# Patient Record
Sex: MALE | Race: WHITE
[De-identification: names, ages, dates, MRNs, and addresses within clinical notes are randomized per-mention and may not be internally consistent; named-entity substitution may affect disease eponyms.]

---

## 2019-12-26 ENCOUNTER — HOSPITAL ENCOUNTER (EMERGENCY)
Dept: HOSPITAL 97 - ER | Age: 64
Discharge: HOME | End: 2019-12-26
Payer: COMMERCIAL

## 2019-12-26 VITALS — OXYGEN SATURATION: 100 % | TEMPERATURE: 98.2 F

## 2019-12-26 DIAGNOSIS — E03.9: ICD-10-CM

## 2019-12-26 DIAGNOSIS — S91.331A: Primary | ICD-10-CM
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
## 2019-12-26 DIAGNOSIS — Z88.5: ICD-10-CM
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
## 2019-12-26 DIAGNOSIS — E11.9: ICD-10-CM

## 2019-12-26 DIAGNOSIS — Z23: ICD-10-CM

## 2019-12-26 PROCEDURE — 90471 IMMUNIZATION ADMIN: CPT

## 2019-12-26 PROCEDURE — 90714 TD VACC NO PRESV 7 YRS+ IM: CPT

## 2019-12-26 PROCEDURE — 99283 EMERGENCY DEPT VISIT LOW MDM: CPT

## 2019-12-26 NOTE — EDPHYS
Physician Documentation                                                                           

 [facility]                                                                 

Name: Yariel Saini                                                                                  

Age: 64 yrs                                                                                       

Sex: Male                                                                                         

: 1955                                                                                   

MRN: [id number]                                                                                   

Arrival Date: 2019                                                                          

Time: 13:45                                                                                       

Account#: [id number]                                                                            

Bed 11                                                                                            

Private MD:                                                                                       

ED Physician Donato Morillo                                                                         

HPI:                                                                                              

                                                                                             

14:45 This 64 yrs old  Male presents to ER via Wheelchair with complaints of         kb  

      Puncture Wound To Foot.                                                                     

14:42 The patient presents with a puncture wound, from a nail. The complaints affect the      kb  

      right foot. Context: The problem was sustained outdoors, resulted from the patient          

      stepping on a nail, the patient can fully bear weight, the patient is able to ambulate.     

      Onset: The symptoms/episode began/occurred yesterday. Modifying factors: The symptoms       

      are alleviated by nothing, the symptoms are aggravated by nothing. Associated signs and     

      symptoms: The patient has no apparent associated signs or symptoms. Severity of             

      symptoms: At their worst the symptoms were mild, in the emergency department the            

      symptoms are unchanged. The patient has not experienced similar symptoms in the past.       

      The patient has not recently seen a physician. Pt stepped on nail yesterday. Pt is          

      diabetic and also needs tetanus shot.                                                       

                                                                                                  

Historical:                                                                                       

- Allergies:                                                                                      

14:16 Codeine;                                                                                iw  

- Home Meds:                                                                                      

14:16 Metformin Oral [Active]; Lactulose Oral [Active]; rifaximin oral oral [Active]; [place]iv[place]  iw  

      Thyroid 60 mg Oral tab 60 mg after meals and before bedtime for Hypothyroidism [Active];    

- PMHx:                                                                                           

14:16 Hypothyroidism; Diabetes - NIDDM; liver issues;                                         iw  

- PSHx:                                                                                           

14:16 left shoulder;                                                                          iw  

                                                                                                  

- Immunization history:: Adult Immunizations unknown.                                             

- Social history:: Smoking status: Patient/guardian denies using tobacco.                         

- Ebola Screening: : Patient negative for fever greater than or equal to 101.5 degrees            

  Fahrenheit, and additional compatible Ebola Virus Disease symptoms Patient denies               

  exposure to infectious person Patient denies travel to an Ebola-affected area in the            

  21 days before illness onset No symptoms or risks identified at this time.                      

                                                                                                  

                                                                                                  

ROS:                                                                                              

14:42 MS/extremity: Positive for                                                              kb  

14:42 Constitutional: Negative for fever, chills, and weight loss, Neck: Negative for injury,     

      pain, and swelling, Cardiovascular: Negative for chest pain, palpitations, and edema,       

      Respiratory: Negative for shortness of breath, cough, wheezing, and pleuritic chest         

      pain, Abdomen/GI: Negative for abdominal pain, nausea, vomiting, diarrhea, and              

      constipation, Back: Negative for injury and pain, MS/Extremity: Negative for injury and     

      deformity, Neuro: Negative for headache, weakness, numbness, tingling, and seizure.         

14:42 Skin: Positive for puncture, of the heel of right foot.                                     

                                                                                                  

Exam:                                                                                             

14:41 Constitutional:  This is a well developed, well nourished patient who is awake, alert,  kb  

      and in no acute distress. Head/Face:  Normocephalic, atraumatic. Chest/axilla:  Normal      

      chest wall appearance and motion.  Nontender with no deformity.  No lesions are             

      appreciated. Cardiovascular:  Regular rate and rhythm with a normal S1 and S2.  No          

      gallops, murmurs, or rubs.  Normal PMI, no JVD.  No pulse deficits. Respiratory:  Lungs     

      have equal breath sounds bilaterally, clear to auscultation and percussion.  No rales,      

      rhonchi or wheezes noted.  No increased work of breathing, no retractions or nasal          

      flaring. Abdomen/GI:  Soft, non-tender, with normal bowel sounds.  No distension or         

      tympany.  No guarding or rebound.  No evidence of tenderness throughout. MS/ Extremity:     

       Pulses equal, no cyanosis.  Neurovascular intact.  Full, normal range of motion.           

      Neuro:  Awake and alert, GCS 15, oriented to person, place, time, and situation.            

      Cranial nerves II-XII grossly intact.  Motor strength 5/5 in all extremities.  Sensory      

      grossly intact.  Cerebellar exam normal.  Normal gait.                                      

14:41 Skin: injury, puncture(s), that are superficial, of the heel of right foot.                 

                                                                                                  

Vital Signs:                                                                                      

14:16 Pulse 84; Resp 16; Temp 98.2; Pulse Ox 100% ; Weight 78.93 kg; Height 5 ft. 10 in.      iw  

      (177.80 cm); Pain 8/10;                                                                     

14:16 Body Mass Index 24.97 (78.93 kg, 177.80 cm)                                             iw  

                                                                                                  

MDM:                                                                                              

14:08 Patient medically screened.                                                             kb  

14:41 Data reviewed: vital signs, nurses notes. Data interpreted: Pulse oximetry: on room air kb  

      is 100 %. Interpretation: normal.                                                           

14:43 Counseling: I had a detailed discussion with the patient and/or guardian regarding: the kb  

      historical points, exam findings, and any diagnostic results supporting the                 

      discharge/admit diagnosis, radiology results, the need for outpatient follow up, a          

      family practitioner, to return to the emergency department if symptoms worsen or            

      persist or if there are any questions or concerns that arise at home.                       

                                                                                                  

                                                                                             

14:10 Order name: Foot Right 3 View XRAY; Complete Time: 14:50                                kb  

                                                                                                  

Administered Medications:                                                                         

15:05 Drug: Tetanus-Diphtheria Toxoid Adult 0.5 ml {: [employer]. Exp:         iw  

      2020. Lot #: [id number]. } Route: IM; Site: right deltoid;                                

15:10 Drug: KeFLEX 500 mg Route: PO;                                                          iw  

                                                                                                  

                                                                                                  

Disposition:                                                                                      

19:05 Co-signature as Attending Physician, Donato Morillo MD.                                    rn  

                                                                                                  

Disposition:                                                                                      

19 14:45 Discharged to Home. Impression: Puncture wound without foreign body of foot.       

- Condition is Stable.                                                                            

- Discharge Instructions: Puncture Wound, Easy-to-Read.                                           

- Prescriptions for Keflex 500 mg Oral Capsule - take 1 capsule by ORAL route every 8             

  hours for 10 days; 30 capsule.                                                                  

- Medication Reconciliation Form, Thank You Letter, Antibiotic Education, Prescription            

  Opioid Use form.                                                                                

- Follow up: Emergency Department; When: As needed; Reason: Worsening of condition.               

  Follow up: Private Physician; When: 2 - 3 days; Reason: Recheck today's complaints,             

  Continuance of care, Re-evaluation by your physician.                                           

                                                                                                  

                                                                                                  

                                                                                                  

Signatures:                                                                                       

Dispatcher [employer]                           [facility]                                                 

Neena Scruggs, FNP-C                 FNP-Kayley Zimmer RN RN iw Nieto, Roman, MD MD   rn                                                   

                                                                                                  

Corrections: (The following items were deleted from the chart)                                    

15:20 14:45 2019 14:45 Discharged to Home. Impression: Puncture wound without foreign   iw  

      body of foot. Condition is Stable. Discharge Instructions: Puncture Wound,                  

      Easy-to-Read. Prescriptions for Keflex 500 mg Oral Capsule - take 1 capsule by ORAL         

      route every 8 hours for 10 days; 30 capsule. and Forms are Medication Reconciliation        

      Form, Thank You Letter, Antibiotic Education, Prescription Opioid Use. Follow up:           

      Emergency Department; When: As needed; Reason: Worsening of condition. Follow up:           

      Private Physician; When: 2 - 3 days; Reason: Recheck today's complaints, Continuance of     

      care, Re-evaluation by your physician. kb                                                   

                                                                                                  

**************************************************************************************************

## 2019-12-26 NOTE — ER
Nurse's Notes                                                                                     

 [facility]                                                                 

Name: Yariel Saini                                                                                  

Age: 64 yrs                                                                                       

Sex: Male                                                                                         

: 1955                                                                                   

MRN: [id number]                                                                                   

Arrival Date: 2019                                                                          

Time: 13:45                                                                                       

Account#: [id number]                                                                            

Bed 11                                                                                            

Private MD:                                                                                       

Diagnosis: Puncture wound without foreign body of foot                                            

                                                                                                  

Presentation:                                                                                     

                                                                                             

14:12 Presenting complaint: Patient states: steppe on a nail yesterday evening, right foot.   iw  

      Transition of care: patient was not received from another setting of care. Onset of         

      symptoms was 2019. Risk Assessment: Do you want to hurt yourself or            

      someone else? Patient reports no desire to harm self or others. Initial Sepsis Screen:      

      Does the patient meet any 2 criteria? No. Patient's initial sepsis screen is negative.      

      Does the patient have a suspected source of infection? No. Patient's initial sepsis         

      screen is negative. Care prior to arrival: None.                                            

14:12 Method Of Arrival: Wheelchair                                                           iw  

14:12 Acuity: ALESSANDRA 4                                                                           iw  

                                                                                                  

Triage Assessment:                                                                                

15:00 General: Appears Behavior is calm.                                                      iw  

                                                                                                  

Historical:                                                                                       

- Allergies:                                                                                      

14:16 Codeine;                                                                                iw  

- Home Meds:                                                                                      

14:16 Metformin Oral [Active]; Lactulose Oral [Active]; rifaximin oral oral [Active]; [place]  iw  

      Thyroid 60 mg Oral tab 60 mg after meals and before bedtime for Hypothyroidism [Active];    

- PMHx:                                                                                           

14:16 Hypothyroidism; Diabetes - NIDDM; liver issues;                                         iw  

- PSHx:                                                                                           

14:16 left shoulder;                                                                          iw  

                                                                                                  

- Immunization history:: Adult Immunizations unknown.                                             

- Social history:: Smoking status: Patient/guardian denies using tobacco.                         

- Ebola Screening: : Patient negative for fever greater than or equal to 101.5 degrees            

  Fahrenheit, and additional compatible Ebola Virus Disease symptoms Patient denies               

  exposure to infectious person Patient denies travel to an Ebola-affected area in the            

  21 days before illness onset No symptoms or risks identified at this time.                      

                                                                                                  

                                                                                                  

Screening:                                                                                        

15:15 Abuse screen: Denies threats or abuse. Denies injuries from another. Nutritional        iw  

      screening: No deficits noted. Tuberculosis screening: No symptoms or risk factors           

      identified. Fall Risk None identified.                                                      

                                                                                                  

Assessment:                                                                                       

14:50 General: Appears in no apparent distress. Behavior is calm, cooperative. Pain:          iw  

      Complains of pain in right foot and heel of right foot. Neuro: Level of Consciousness       

      is awake, alert, obeys commands. Cardiovascular: Patient's skin is warm and dry.            

      Respiratory: Airway Respiratory effort is even, unlabored. Derm: Skin is intact, is         

      healthy with good turgor. Musculoskeletal: Range of motion: intact in all extremities.      

                                                                                                  

Vital Signs:                                                                                      

14:16 Pulse 84; Resp 16; Temp 98.2; Pulse Ox 100% ; Weight 78.93 kg; Height 5 ft. 10 in.      iw  

      (177.80 cm); Pain 8/10;                                                                     

14:16 Body Mass Index 24.97 (78.93 kg, 177.80 cm)                                             iw  

                                                                                                  

ED Course:                                                                                        

13:45 Patient arrived in ED.                                                                  rg4 

14:08 [facility]                                                        kb  

14:08 Donato Morillo MD is Attending Physician.                                                kb  

14:11 Margret Marshall, RN is Primary Nurse.                                                  dm5 

14:14 Triage completed.                                                                       iw  

14:17 Primary Nurse role handed off by Margret Marshall, DINORAH                                   iw  

14:17 Kayley Colindres RN is Primary Nurse.                                                   iw  

14:17 Arm band placed on.                                                                     iw  

14:36 Foot Right 3 View XRAY In Process Unspecified.                                          EDMS

14:50 Patient has correct armband on for positive identification.                             iw  

15:19 No provider procedures requiring assistance completed. Patient did not have IV access   iw  

      during this emergency room visit.                                                           

                                                                                                  

Administered Medications:                                                                         

15:05 Drug: Tetanus-Diphtheria Toxoid Adult 0.5 ml {: [employer]. Exp:         iw  

      2020. Lot #: [id number]. } Route: IM; Site: right deltoid;                                

15:10 Drug: KeFLEX 500 mg Route: PO;                                                          iw  

                                                                                                  

                                                                                                  

Outcome:                                                                                          

14:45 Discharge ordered by MD.                                                                kb  

15:19 Discharged to home ambulatory.                                                          iw  

15:19 Condition: good                                                                             

15:19 Discharge instructions given to patient, Instructed on discharge instructions, follow       

      up and referral plans. Demonstrated understanding of instructions, follow-up care,          

      medications, Prescriptions given X 1.                                                       

15:20 Patient left the ED.                                                                    iw  

                                                                                                  

Signatures:                                                                                       

Dispatcher [employer]                           EDMS                                                 

Neena Scruggs FNP-C FNP-Margret Terry RN RN   dm5                                                  

Kayley Colindres RN                     RN                                                      

Pricilla Martinez                                 rg4                                                  

                                                                                                  

**************************************************************************************************

## 2019-12-26 NOTE — RAD REPORT
EXAM DESCRIPTION:  RAD - Foot Right 3 View - 12/26/2019 2:36 pm

 

CLINICAL HISTORY:  r/o fb

Pain and swelling

 

COMPARISON:  No comparisons

 

FINDINGS:  Soft tissue swelling is seen along the medial aspect of the foot. No fracture or radiopaqu
e foreign body seen.

## 2022-03-27 ENCOUNTER — HOSPITAL ENCOUNTER (OUTPATIENT)
Dept: HOSPITAL 97 - ER | Age: 67
Setting detail: OBSERVATION
LOS: 2 days | Discharge: HOME | End: 2022-03-29
Attending: HOSPITALIST | Admitting: HOSPITALIST
Payer: COMMERCIAL

## 2022-03-27 VITALS — BODY MASS INDEX: 22.7 KG/M2

## 2022-03-27 DIAGNOSIS — J11.1: Primary | ICD-10-CM

## 2022-03-27 DIAGNOSIS — E03.9: ICD-10-CM

## 2022-03-27 DIAGNOSIS — B18.2: ICD-10-CM

## 2022-03-27 DIAGNOSIS — Z20.822: ICD-10-CM

## 2022-03-27 DIAGNOSIS — D70.9: ICD-10-CM

## 2022-03-27 DIAGNOSIS — D69.6: ICD-10-CM

## 2022-03-27 DIAGNOSIS — E11.9: ICD-10-CM

## 2022-03-27 DIAGNOSIS — K74.60: ICD-10-CM

## 2022-03-27 LAB
ALBUMIN SERPL BCP-MCNC: 3.3 G/DL (ref 3.4–5)
ALP SERPL-CCNC: 100 U/L (ref 45–117)
ALT SERPL W P-5'-P-CCNC: 69 U/L (ref 12–78)
AST SERPL W P-5'-P-CCNC: 72 U/L (ref 15–37)
BUN BLD-MCNC: 10 MG/DL (ref 7–18)
GLUCOSE SERPLBLD-MCNC: 231 MG/DL (ref 74–106)
HCT VFR BLD CALC: 40.4 % (ref 39.6–49)
INR BLD: 1.27
LYMPHOCYTES # SPEC AUTO: 0.3 K/UL (ref 0.7–4.9)
MAGNESIUM SERPL-MCNC: 1.8 MG/DL (ref 1.8–2.4)
NT-PROBNP SERPL-MCNC: 45 PG/ML (ref ?–125)
PMV BLD: 9.4 FL (ref 7.6–11.3)
POTASSIUM SERPL-SCNC: 4 MMOL/L (ref 3.5–5.1)
RBC # BLD: 3.96 M/UL (ref 4.33–5.43)
SARS-COV-2 RNA RESP QL NAA+PROBE: NEGATIVE
TROPONIN I SERPL HS-MCNC: 15.7 PG/ML (ref ?–58.9)
URINE UROTHELIAL CELLS: <5 /HPF

## 2022-03-27 PROCEDURE — 83880 ASSAY OF NATRIURETIC PEPTIDE: CPT

## 2022-03-27 PROCEDURE — 93005 ELECTROCARDIOGRAM TRACING: CPT

## 2022-03-27 PROCEDURE — 85025 COMPLETE CBC W/AUTO DIFF WBC: CPT

## 2022-03-27 PROCEDURE — 81015 MICROSCOPIC EXAM OF URINE: CPT

## 2022-03-27 PROCEDURE — 84100 ASSAY OF PHOSPHORUS: CPT

## 2022-03-27 PROCEDURE — 99285 EMERGENCY DEPT VISIT HI MDM: CPT

## 2022-03-27 PROCEDURE — 83735 ASSAY OF MAGNESIUM: CPT

## 2022-03-27 PROCEDURE — 85610 PROTHROMBIN TIME: CPT

## 2022-03-27 PROCEDURE — 80053 COMPREHEN METABOLIC PANEL: CPT

## 2022-03-27 PROCEDURE — 80048 BASIC METABOLIC PNL TOTAL CA: CPT

## 2022-03-27 PROCEDURE — 80076 HEPATIC FUNCTION PANEL: CPT

## 2022-03-27 PROCEDURE — 96374 THER/PROPH/DIAG INJ IV PUSH: CPT

## 2022-03-27 PROCEDURE — 71045 X-RAY EXAM CHEST 1 VIEW: CPT

## 2022-03-27 PROCEDURE — 87040 BLOOD CULTURE FOR BACTERIA: CPT

## 2022-03-27 PROCEDURE — 82947 ASSAY GLUCOSE BLOOD QUANT: CPT

## 2022-03-27 PROCEDURE — 84145 PROCALCITONIN (PCT): CPT

## 2022-03-27 PROCEDURE — 0240U: CPT

## 2022-03-27 PROCEDURE — 71260 CT THORAX DX C+: CPT

## 2022-03-27 PROCEDURE — 84443 ASSAY THYROID STIM HORMONE: CPT

## 2022-03-27 PROCEDURE — 83605 ASSAY OF LACTIC ACID: CPT

## 2022-03-27 PROCEDURE — 81003 URINALYSIS AUTO W/O SCOPE: CPT

## 2022-03-27 PROCEDURE — 84439 ASSAY OF FREE THYROXINE: CPT

## 2022-03-27 PROCEDURE — 96375 TX/PRO/DX INJ NEW DRUG ADDON: CPT

## 2022-03-27 PROCEDURE — 36415 COLL VENOUS BLD VENIPUNCTURE: CPT

## 2022-03-27 PROCEDURE — 84484 ASSAY OF TROPONIN QUANT: CPT

## 2022-03-27 PROCEDURE — 74177 CT ABD & PELVIS W/CONTRAST: CPT

## 2022-03-28 LAB
ALBUMIN SERPL BCP-MCNC: 2.9 G/DL (ref 3.4–5)
ALP SERPL-CCNC: 88 U/L (ref 45–117)
ALT SERPL W P-5'-P-CCNC: 64 U/L (ref 12–78)
AST SERPL W P-5'-P-CCNC: 59 U/L (ref 15–37)
BUN BLD-MCNC: 12 MG/DL (ref 7–18)
GLUCOSE SERPLBLD-MCNC: 311 MG/DL (ref 74–106)
HCT VFR BLD CALC: 37.2 % (ref 39.6–49)
LYMPHOCYTES # SPEC AUTO: 0.5 K/UL (ref 0.7–4.9)
MAGNESIUM SERPL-MCNC: 1.8 MG/DL (ref 1.8–2.4)
MORPHOLOGY BLD-IMP: (no result)
PMV BLD: 9.7 FL (ref 7.6–11.3)
POTASSIUM SERPL-SCNC: 3.8 MMOL/L (ref 3.5–5.1)
RBC # BLD: 3.56 M/UL (ref 4.33–5.43)
TSH SERPL DL<=0.05 MIU/L-ACNC: 0.06 UIU/ML (ref 0.36–3.74)

## 2022-03-28 RX ADMIN — Medication SCH: at 08:59

## 2022-03-28 RX ADMIN — Medication SCH: at 21:00

## 2022-03-28 RX ADMIN — HUMAN INSULIN SCH: 100 INJECTION, SOLUTION SUBCUTANEOUS at 16:30

## 2022-03-28 RX ADMIN — HUMAN INSULIN SCH UNIT: 100 INJECTION, SOLUTION SUBCUTANEOUS at 21:00

## 2022-03-28 RX ADMIN — SODIUM CHLORIDE SCH MLS: 0.9 INJECTION, SOLUTION INTRAVENOUS at 02:42

## 2022-03-28 RX ADMIN — SODIUM CHLORIDE SCH: 0.9 INJECTION, SOLUTION INTRAVENOUS at 11:44

## 2022-03-28 RX ADMIN — OSELTAMIVIR PHOSPHATE SCH MG: 75 CAPSULE ORAL at 08:58

## 2022-03-28 RX ADMIN — HUMAN INSULIN SCH UNIT: 100 INJECTION, SOLUTION SUBCUTANEOUS at 12:17

## 2022-03-28 RX ADMIN — OSELTAMIVIR PHOSPHATE SCH MG: 75 CAPSULE ORAL at 21:00

## 2022-03-28 RX ADMIN — HUMAN INSULIN SCH UNIT: 100 INJECTION, SOLUTION SUBCUTANEOUS at 08:58

## 2022-03-28 RX ADMIN — SODIUM CHLORIDE SCH MLS: 0.9 INJECTION, SOLUTION INTRAVENOUS at 16:53

## 2022-03-28 NOTE — RAD REPORT
EXAM DESCRIPTION:  CT - Chest Abdomen Pelvis W Cont - 3/28/2022 2:35 am

 

CLINICAL HISTORY:  67-year-old male with fever.

 

COMPARISON:  None.

 

TECHNIQUE:  CT of the chest, abdomen and pelvis was performed following intravenous administration of
 contrast. Oral contrast was not administered. Multiplanar reformatted images were provided. This exa
m was performed according to our departmental dose optimization program which includes use of automat
ed exposure control, adjustment of the mA and/or kV according to patient size and/or use of iterative
 reconstruction technique.

 

FINDINGS:  Chest: Evaluation through the lung bases reveals no focal opacity, pleural effusion or pne
umothorax. Heart size is within normal limits. No pericardial effusion. Air-fluid level present withi
n a patulous esophagus may be secondary to esophageal dysmotility or reflux.

Abdomen and pelvis: The gallbladder, pancreas, spleen, bilateral kidneys and bilateral adrenal glands
 are within normal limits.

Intrahepatic TIPS stent graft is in place. Patency cannot be evaluated, however secondary to poor con
trast opacification. If there is clinical concern for occlusion, sonography may be considered.

Mild hepatomegaly measuring 17 cm. Minimal nodularity of the hepatic contour raising the possibility 
of cirrhosis in the correct clinical setting. Multifocal upper abdominal varices including at the lev
el of the gastroesophageal junction.

The vessels are patent and normal in caliber.

No abdominopelvic lymph nodes are noted to be pathologically enlarged by CT measurement criteria.

The bowel is within normal limits without abnormal bowel wall thickness or bowel dilation. Diverticul
ar disease without findings to suggest diverticulitis.

No free air. No free abdominopelvic fluid collections. The appendix is within normal limits.

The osseous structures reveal degenerative change of the lumbar spine. L5-S1 loss of disk height with
 vacuum disk phenomenon and posterior osseous spurring and small disk bulge. Small right-sided testic
ular hydrocele.

 

IMPRESSION:  1.   No specific acute intra-abdominal findings are noted to suggest etiology of the pat
ient's abdominal pain.

2.   Air-fluid level present within a patulous esophagus may be secondary to esophageal dysmotility o
r reflux.

3.   Intrahepatic TIPS stent graft is in place. Patency cannot be evaluated, however secondary to poo
r contrast opacification. If there is clinical concern for occlusion, sonography may be considered.

4.   Mild hepatomegaly measuring 15 cm. Minimal nodularity of the hepatic contour raising the possibi
lity of cirrhosis in the correct clinical setting.

5.   Diverticular disease without findings to suggest diverticulitis.

6.   Small right-sided testicular hydrocele.

 

Electronically signed by:   Kadie Hanks MD   3/27/2022 11:38 PM CDT Workstation: [id number]

 

 

Due to temporary technical issues with the PACS/Fluency reporting system, reports are being signed by
 the in house radiologist without review as a courtesy to ensure prompt reporting. The interpreting r
adiologist is fully responsible for the content of the report.

## 2022-03-28 NOTE — EDPHYS
Physician Documentation                                                                           

 [facility]                                                                 

Name: Yariel Saini                                                                                  

Age: 67 yrs                                                                                       

Sex: Male                                                                                         

: 1955                                                                                   

MRN: [id number]                                                                                   

Arrival Date: 2022                                                                          

Time: 21:27                                                                                       

Account#: [id number]                                                                            

Bed 20                                                                                            

Private MD:                                                                                       

ED Physician Laron Lord                                                                      

HPI:                                                                                              

                                                                                             

22:00 This 67 yrs old Male presents to ER via Ambulatory with complaints of Fever, Headache,  cp  

      ACHES ALL OVER.                                                                             

22:00 The patient reports fever, with an emergency department temperature of 101.6 degrees    cp  

      Fahrenheit. Onset: The symptoms/episode began/occurred today. Associated signs and          

      symptoms: Pertinent positives: cough, headache, body aches, Pertinent negatives:            

      abdominal pain, altered mental status, chest pain, diarrhea, vomiting. Severity of          

      symptoms: in the emergency department the symptoms are unchanged despite home               

      interventions.                                                                              

                                                                                                  

Historical:                                                                                       

- Allergies:                                                                                      

21:41 No Known Allergies;                                                                     ab2 

- Home Meds:                                                                                      

22:18 Gilman Thyroid 60 mg Oral tab 60 mg after meals and before bedtime for Hypothyroidism   kd3 

      [Active]; Metformin Oral [Active]; rifaximin Oral [Active];                                 

- PMHx:                                                                                           

21:41 Diabetes - NIDDM; Hypothyroidism; Liver Issues;                                         ab2 

                                                                                                  

- Immunization history:: Adult Immunizations up to date.                                          

- Social history:: Smoking status: Patient denies any tobacco usage or history of.                

                                                                                                  

                                                                                                  

ROS:                                                                                              

22:05 Constitutional: Positive for body aches, fever.                                         cp  

22:05 Eyes: Negative for injury, pain, redness, and discharge.                                cp  

22:05 ENT: Negative for drainage from ear(s), ear pain, sore throat, difficulty swallowing,       

      difficulty handling secretions.                                                             

22:05 Cardiovascular: Negative for chest pain, edema, palpitations.                               

22:05 Respiratory: Positive for cough, with no reported sputum, Negative for shortness of         

      breath, wheezing.                                                                           

22:05 Abdomen/GI: Negative for abdominal pain, vomiting, diarrhea, constipation.                  

22:05 : Negative for urinary symptoms, flank pain.                                              

22:05 Skin: Negative for rash.                                                                    

22:05 Neuro: Positive for headache, Negative for altered mental status, numbness, weakness.       

22:05 All other systems are negative.                                                             

                                                                                                  

Exam:                                                                                             

22:10 Constitutional: The patient appears in no acute distress, alert, awake,                 cp  

      non-diaphoretic, non-toxic, well developed, well nourished, febrile.                        

22:10 Head/Face:  Normocephalic, atraumatic.                                                  cp  

22:10 Eyes: Periorbital structures: appear normal, Pupils: equal, round, and reactive to          

      light and accomodation, Extraocular movements: intact throughout, Conjunctiva: normal,      

      no exudate, no injection, Sclera: no appreciated abnormality, Lids and lashes: appear       

      normal, bilaterally.                                                                        

22:10 ENT: External ear(s): are unremarkable, Ear canal(s): are normal, clear, TM's:              

      dullness, bilaterally, Nose: is normal, Mouth: Lips: dry, Oral mucosa: moist, Posterior     

      pharynx: Airway: no evidence of obstruction, patent, Tonsils: are normal in appearance,     

      swelling, is not appreciated, erythema, that is mild, exudate, is not appreciated.          

22:10 Neck: ROM/movement: is normal, is supple, without pain, no range of motions                 

      limitations, no meningismus.                                                                

22:10 Chest/axilla: Inspection: normal, Palpation: is normal, no crepitus, no tenderness.         

22:10 Cardiovascular: Rate: tachycardic, Rhythm: regular, Edema: is not appreciated, JVD: is      

      not appreciated.                                                                            

22:10 Respiratory: the patient does not display signs of respiratory distress,  Respirations:     

      normal, no use of accessory muscles, no retractions, labored breathing, is not present,     

      Breath sounds: bronchial sounds, that are mild, are heard diffusely, decreased breath       

      sounds, are not appreciated, stridor, is not appreciated, wheezing: is not appreciated.     

22:10 Abdomen/GI: Inspection: abdomen appears normal, Bowel sounds: active, all quadrants,        

      Palpation: abdomen is soft and non-tender, in all quadrants.                                

22:10 Back: pain, is absent, ROM is normal.                                                       

22:10 Skin: cellulitis, is not appreciated, no rash present.                                      

22:10 Neuro: Orientation: to person, place \T\ time. Mentation: is normal, Motor: moves all       

      fours, strength is normal, Sensation: is normal.                                            

22:15 ECG was reviewed by the Attending Physician.                                            cp  

                                                                                                  

Vital Signs:                                                                                      

21:38  / 76; Pulse 105; Resp 20; Temp 101.6; Pulse Ox 95% on R/A; Weight 78.47 kg;      ab2 

      Height 5 ft. 11 in. (180.34 cm); Pain 6/10;                                                 

23:03  / 71; Pulse 85; Resp 16; Temp 99.4(O); Pulse Ox 95% ;                            kd3 

21:38 Body Mass Index 24.13 (78.47 kg, 180.34 cm)                                             ab2 

                                                                                                  

MDM:                                                                                              

21:56 Patient medically screened.                                                             cp  

22:00 Differential diagnosis: viral Infection, bacterial infection, bronchitis, pneumonia     cp  

      gastroenteritis, meningitis, influenza, UTI, sepsis.                                        

23:45 Post IV fluid administration reassessment for Sepsis: Client prescribed 30 mL/kg IVF.   cp  

      Focused Assessment performed: 2022 at 23:45 Heart: Regular rate/rhythm noted.     

      Lungs: mild bronchial sounds noted throughout Skin examination performed. Skin noted to     

      have normal turgor. Current vital signs reviewed: Yes. Neuro: no change Cardio:             

      Cardiovascular examination improved from previous exam. Heart rate and blood pressure       

      have improved. Respiratory: no signs of respiratory distress.                               

23:45 Data reviewed: vital signs, nurses notes, lab test result(s), EKG, radiologic studies,  cp  

      plain films.                                                                                

                                                                                             

00:10 Physician consultation: Simona DODSON was called at 00:05, was contacted at 00:05,    cp  

      regarding admission, to the telemetry unit. patient's condition, and will see patient       

      in ED, shortly.                                                                             

                                                                                                  

                                                                                             

21:56 Order name: Basic Metabolic Panel; Complete Time: 22:36                                 cp  

                                                                                             

22:36 Interpretation: Normal except: GLUC 231.                                                cp  

                                                                                             

21:56 Order name: CBC with Diff; Complete Time: 00:45                                         cp  

                                                                                             

22:46 Interpretation: Normal except: WBC 1.7; RBC 3.96; .1; MCH 35.7; PLT 74; MN%      cp  

      22.1; NEUT A 1.0; LYMA 0.3.                                                                 

                                                                                             

21:56 Order name: LFT's; Complete Time: 22:36                                                 cp  

                                                                                             

23:40 Interpretation: Normal except: AST 72; BILIT 1.8; BILID 0.6; TP 6.3; ALB 3.3.           cp  

                                                                                             

21:56 Order name: Magnesium; Complete Time: 22:36                                             cp  

                                                                                             

21:56 Order name: NT PRO-BNP; Complete Time: 22:36                                            cp  

                                                                                             

21:56 Order name: PT-INR; Complete Time: 22:36                                                  

                                                                                             

21:56 Order name: Troponin HS; Complete Time: 22:36                                             

                                                                                             

21:56 Order name: Procalcitonin; Complete Time: 23:39                                           

                                                                                             

21:56 Order name: Lactate; Complete Time: 22:36                                                 

                                                                                             

21:56 Order name: Blood Culture Adult (2)                                                       

                                                                                             

21:56 Order name: Urine Microscopic Only; Complete Time: 23:39                                  

                                                                                             

23:39 Interpretation: INFLUENZA A POSITIVE; Reviewed.                                           

                                                                                             

21:58 Order name: Urine Microscopic Only; Complete Time: 00:02                                EDMS

                                                                                             

22:39 Order name: Manual Differential; Complete Time: 00:45                                   EDMS

                                                                                             

00:45 Interpretation: Abnormal: BANDS [F] 6.                                                    

                                                                                             

21:56 Order name: XRAY Chest (1 view)                                                           

                                                                                             

21:56 Order name: EKG; Complete Time: 21:57                                                     

                                                                                             

21:56 Order name: Cardiac monitoring; Complete Time: 22:12                                      

                                                                                             

21:56 Order name: EKG - Nurse/Tech; Complete Time: 22:12                                        

                                                                                             

21:56 Order name: IV Saline Lock; Complete Time: 22:07                                          

                                                                                             

21:56 Order name: Labs collected and sent; Complete Time: 22:07                                 

                                                                                             

21:56 Order name: O2 Per Protocol; Complete Time: 22:12                                         

                                                                                             

21:56 Order name: O2 Sat Monitoring; Complete Time: 22:12                                       

                                                                                             

21:56 Order name: Urine Dipstick-Ancillary (obtain specimen); Complete Time: 22:43              

                                                                                             

22:38 Order name: CT Chest, Abdomen, Pelvis - W/Contrast                                        

                                                                                             

22:40 Order name: Urine Dipstick-Ancillary; Complete Time: 22:46                              EDMS

                                                                                             

22:47 Interpretation: Normal except: UGLUC 3+; UKET Trace; UPROT 1+.                          cp  

                                                                                                  

EC/27                                                                                             

22:15 Rate is 93 beats/min. Rhythm is regular. AK interval is normal. QRS interval is normal. cp  

      QT interval is normal. T waves are Inverted in lead aVR. Interpreted by me. Reviewed by     

      me.                                                                                         

                                                                                                  

Administered Medications:                                                                         

22:01 Drug: NS 0.9% (30 ml/kg) 30 ml/kg Route: IV; Rate: bolus; Site: left antecubital;       ab2 

22:02 Drug: Ibuprofen 800 mg Route: PO;                                                       ab2 

22:19 Drug: NS 0.9% (30 ml/kg) 30 ml/kg Route: IV; Rate: bolus; Site: left antecubital;       kd3 

22:59 Drug: Cefepime 2 grams Route: IVPB; Rate: 200 ml/hr; Infused Over: 30 mins; Site: left  kd3 

      antecubital;                                                                                

                                                                                             

00:15 Drug: Tamiflu (oseltamivir) 75 mg Route: PO;                                            kd3 

                                                                                                  

                                                                                                  

Disposition:                                                                                      

04:45 Co-signature as Attending Physician, Laron Lord MD.                                 [facility] 

                                                                                                  

Disposition Summary:                                                                              

22 00:16                                                                                    

Hospitalization Ordered                                                                           

      Hospitalization Status: Inpatient Admission                                             cp  

      Location: Telemetry/[facility] (Inpatient)                                                 cp  

      Condition: Stable                                                                       cp  

      Problem: new                                                                            cp  

      Symptoms: have improved                                                                 cp  

      Bed/Room Type: Standard                                                                 cp  

      Provider: Paresh Melgoza(22 00:30)                                                cp  

      Room Assignment: [facility](22 00:44)                                                      

      Diagnosis                                                                                   

        - Fever, unspecified                                                                  cp  

        - Influenza due to identified novel influenza A virus                                 cp  

        - Bandemia                                                                            cp  

      Forms:                                                                                      

        - Medication Reconciliation Form                                                      cp  

        - SBAR form                                                                           cp  

Signatures:                                                                                       

Dispatcher [employer]                           EDIvis Chandler RN                        RN                                                      

Shaggy Pollock PA PA   cp                                                   

Laron Lord MD MD   [facility]                                                  

Alyssa Blue RN                      RN   kd3                                                  

Dhruv Dixon                           ab2                                                  

                                                                                                  

Corrections: (The following items were deleted from the chart)                                    

                                                                                             

21:43 21:41 Allergies: Codeine; ab2                                                           ab2 

22:42 22:38 Shaver ordered. cp                                                                 kd3 

                                                                                             

00:30 00:16 Simona Queen cp                                                                  cp  

00:34 00:29 COVID-19/FLU A+B+MOL.LAB.BRZ ordered. EDMS                                        EDMS

00:44 00:16 cp                                                                                mw  

                                                                                                  

**************************************************************************************************

## 2022-03-28 NOTE — ER
Nurse's Notes                                                                                     

 [facility]                                                                 

Name: Yariel Saini                                                                                  

Age: 67 yrs                                                                                       

Sex: Male                                                                                         

: 1955                                                                                   

MRN: [id number]                                                                                   

Arrival Date: 2022                                                                          

Time: :                                                                                       

Account#: [id number]                                                                            

Bed 20                                                                                            

Private MD:                                                                                       

Diagnosis: Fever, unspecified;Influenza due to identified novel influenza A virus;Bandemia        

                                                                                                  

Presentation:                                                                                     

                                                                                             

21:38 Chief complaint: Patient states: "Yesterday, I started I had a headache, I was          ab2 

      coughing, I thought it was my allergies. It has been persistent and tonight I developed     

      a fever." Pt c/o body aches, cough and fever. Chief complaint:. Coronavirus screen:         

      Vaccine status: Patient reports receiving the 2nd dose of the covid vaccine. Client         

      denies travel out of the [place] in the last 14 days. chills, congestion, cough unrelated      

      to allergies, fatigue, fever, muscle pain, Client presents with at least one sign or        

      symptom that may indicate coronavirus-19. Standard/surgical mask placed on the client.      

      Provider contacted for isolation considerations. Ebola Screen: Patient negative for         

      fever greater than or equal to 101.5 degrees Fahrenheit, and additional compatible          

      Ebola Virus Disease symptoms Patient denies exposure to infectious person. Patient          

      denies travel to an Ebola-affected area in the 21 days before illness onset. No             

      symptoms or risks identified at this time. Initial Sepsis Screen: Does the patient meet     

      any 2 criteria? Temp <36.0*C (96.8*F)) or > 38.3*C (100.9*F). HR > 90 bpm. Yes Does the     

      patient have a suspected source of infection? No. Patient's initial sepsis screen is        

      negative. Risk Assessment: Do you want to hurt yourself or someone else? Patient            

      reports no desire to harm self or others. Onset of symptoms is unknown.                     

21:38 Method Of Arrival: Ambulatory                                                           ab2 

21:38 Acuity: ALESSANDRA 3                                                                           ab2 

                                                                                                  

Triage Assessment:                                                                                

21:43 Headache History: Denies prior headaches. General: Appears in no apparent distress.     ab2 

      uncomfortable, Behavior is calm, cooperative, appropriate for age. Pain: Complains of       

      pain in generalized body aches Pain currently is 6 out of 10 on a pain scale. Quality       

      of pain is described as aching, Pain began 1 day ago. Pain: Also complains of no other      

      associated symptoms. Neuro: Level of Consciousness is awake, alert, obeys commands,         

      Oriented to person, place, time, situation, Appropriate for age Reports headache.           

      Respiratory: Reports cough that is Airway is patent Respiratory effort is even,             

      unlabored, Respiratory pattern is regular, symmetrical.                                     

                                                                                                  

Historical:                                                                                       

- Allergies:                                                                                      

21:41 No Known Allergies;                                                                     ab2 

- Home Meds:                                                                                      

22:18 [place] Thyroid 60 mg Oral tab 60 mg after meals and before bedtime for Hypothyroidism   kd3 

      [Active]; Metformin Oral [Active]; rifaximin Oral [Active];                                 

- PMHx:                                                                                           

21:41 Diabetes - NIDDM; Hypothyroidism; Liver Issues;                                         ab2 

                                                                                                  

- Immunization history:: Adult Immunizations up to date.                                          

- Social history:: Smoking status: Patient denies any tobacco usage or history of.                

                                                                                                  

                                                                                                  

Screenin:18 Abuse screen: Denies threats or abuse. Denies injuries from another. Nutritional        kd3 

      screening: No deficits noted. Tuberculosis screening: No symptoms or risk factors           

      identified. Fall Risk IV access (20 points).                                                

                                                                                                  

Assessment:                                                                                       

22:17 General: Appears in no apparent distress. ill, Behavior is calm, cooperative,           kd3 

      appropriate for age. Pain: Denies pain. Neuro: Level of Consciousness is awake, alert,      

      Oriented to person, place, time, situation. Cardiovascular: Patient's skin is warm and      

      dry.                                                                                        

                                                                                                  

Vital Signs:                                                                                      

21:38  / 76; Pulse 105; Resp 20; Temp 101.6; Pulse Ox 95% on R/A; Weight 78.47 kg;      ab2 

      Height 5 ft. 11 in. (180.34 cm); Pain 6/10;                                                 

23:03  / 71; Pulse 85; Resp 16; Temp 99.4(O); Pulse Ox 95% ;                            kd3 

21:38 Body Mass Index 24.13 (78.47 kg, 180.34 cm)                                             ab2 

                                                                                                  

ED Course:                                                                                        

21:27 Patient arrived in ED.                                                                  ja2 

21:41 Triage completed.                                                                       ab2 

21:44 Arm band placed on left wrist.                                                          ab2 

21:45 Shaggy Pollock PA is PHCP.                                                                cp  

21:45 Laron Lord MD is Attending Physician.                                             cp  

22:06 Alyssa Blue, DINORAH is Primary Nurse.                                                    kd3 

22:07 Blood Culture Adult (2) Sent.                                                           kd3 

22:07 Procalcitonin Sent.                                                                     kd3 

22:07 Lactate Sent.                                                                           kd3 

22:19 Bed in low position. Call light in reach. Side rails up X 1.                            kd3 

22:22 XRAY Chest (1 view) In Process Unspecified.                                             EDMS

23:08 CT Chest, Abdomen, Pelvis - W/Contrast In Process Unspecified.                          EDMS

                                                                                             

00:16 Simona Queen PA is Hospitalizing Provider.                                            cp  

00:30 Paresh Melgoza is Hospitalizing Provider.                                               cp  

01:32 No provider procedures requiring assistance completed. Patient admitted, IV remains in  kd3 

      place.                                                                                      

                                                                                                  

Administered Medications:                                                                         

                                                                                             

22:01 Drug: NS 0.9% (30 ml/kg) 30 ml/kg Route: IV; Rate: bolus; Site: left antecubital;       ab2 

22:02 Drug: Ibuprofen 800 mg Route: PO;                                                       ab2 

22:19 Drug: NS 0.9% (30 ml/kg) 30 ml/kg Route: IV; Rate: bolus; Site: left antecubital;       kd3 

22:59 Drug: Cefepime 2 grams Route: IVPB; Rate: 200 ml/hr; Infused Over: 30 mins; Site: left  kd3 

      antecubital;                                                                                

                                                                                             

00:15 Drug: Tamiflu (oseltamivir) 75 mg Route: PO;                                            kd3 

                                                                                                  

                                                                                                  

Outcome:                                                                                          

00:16 Decision to Hospitalize by Provider.                                                    cp  

01:32 Admitted to Med/surg accompanied by herminio vu .                                        kd3 

01:33 Condition: stable                                                                       kd3 

02:06 Patient left the ED.                                                                    kd3 

                                                                                                  

Signatures:                                                                                       

Dispatcher [employer]                           EDMS                                                 

Shaggy Pollock PA PA cp Alexander, Jessica ja2 Doucette, Kyli, RN                      RN   kd3                                                  

Dhruv Dixon2                                                  

                                                                                                  

Corrections: (The following items were deleted from the chart)                                    

                                                                                             

21:43 21:41 Allergies: Codeine; ab2                                                           ab2 

                                                                                                  

**************************************************************************************************

## 2022-03-28 NOTE — RAD REPORT
EXAM DESCRIPTION:  RAD - Chest Single View - 3/27/2022 10:20 pm

 

CLINICAL HISTORY:  Cough;Fever.

 

COMPARISON:  None.

 

TECHNIQUE:  Single view AP chest radiograph(s).

 

FINDINGS:  Mild perihilar interstitial thickening. No infiltrate identified. No pleural effusion. No 
pneumothorax. Nonenlarged cardiomediastinal silhouette. No significant osseous abnormality.

 

IMPRESSION:  Mild perihilar interstitial thickening. No focal infiltrate identified.

 

Electronically signed by:   More Palacios MD   3/27/2022 10:33 PM CDT Workstation: [id number]

 

 

Due to temporary technical issues with the PACS/Fluency reporting system, reports are being signed by
 the in house radiologist without review as a courtesy to ensure prompt reporting. The interpreting r
adiologist is fully responsible for the content of the report.

## 2022-03-28 NOTE — P.HP
Certification for Inpatient


Patient admitted to: Observation


With expected LOS: <2 Midnights


Patient will require the following post-hospital care: None


Practitioner: I am a practitioner with admitting privileges, knowledge of 

patient current condition, hospital course, and medical plan of care.


Services: Services provided to patient in accordance with Admission requirements

found in Title 42 Section 412.3 of the Code of Federal Regulations





Patient History


Date of Service: 03/28/22


Reason for admission: Influenza


History of Present Illness: 


Patient is a 67-year-old  male with cirrhosis status post TIPS 

procedure, hypothyroidism, type 2 diabetes non-insulin-dependent who presented 

to the ED with a 2-day history of body aches, fever, chills, cough, headache.  

He initially flagged sepsis protocol with /76, heart rate 105, temp 101.6 

Fahrenheit, 95% O2 saturation.  Labs significant for white blood cell 1.7, PT 

14, 6 band neutrophils, influenza A positive.  CT chest abdomen pelvis negative.

 In the ED he was given sepsis fluids, 800 mg of ibuprofen, cefepime, tamiflu.  

Upon my assessment, patient reports he is feeling much better.  His vital signs 

have stabilized.  Will admit patient for observation.





Allergies





codeine Adverse Reaction (Verified 04/12/16 11:56)


   Nausea/Vomiting


hydrocodone Adverse Reaction (Verified 04/12/16 17:20)


   Nausea/Vomiting


octreotide [From Sandostatin] Adverse Reaction (Verified 04/13/16 19:52)


   Hives/Rash


ondansetron [From Zofran (as hydrochloride)] Adverse Reaction (Verified 04/13/16

19:52)


   Hives/Rash





Home medications list reviewed: Yes


Home Medications: 








Thyroid,Pork [[place] Thyroid] 60 mg PO DAILY 04/12/16 


Zolpidem Tartrate [Ambien] 5 mg PO PRN 04/12/16 








- Past Medical/Surgical History


Diabetic: Yes


-: Hep C


-: Thyroid Disorder


-: Type 2 Diabetes- Non Insulin Dependent 


-: Cirrhosis 


-: TIPS 


Psychosocial/ Personal History: Patient lives at home with his wife.





- Family History


  ** Father


-: Heart disease, Diabetes





  ** Mother


-: Heart disease, Hypertension





- Social History


Smoking Status: Never smoker


Alcohol use: No


CD- Drugs: No


Caffeine use: Yes


Place of Residence: Home





Review of Systems


General: Fever, Chills, Weakness, Malaise, As per HPI


Respiratory: Cough, Shortness of Breath





Physical Examination





- Physical Exam


General: Alert, In no apparent distress, Oriented x3


HEENT: Atraumatic, PERRLA, Mucous membr. moist/pink, EOMI, Sclerae nonicteric


Neck: Supple, 2+ carotid pulse no bruit, No LAD, Without JVD or thyroid 

abnormality


Respiratory: Clear to auscultation bilaterally, Normal air movement


Cardiovascular: Regular rate/rhythm, Normal S1 S2


Gastrointestinal: Normal bowel sounds, No tenderness


Musculoskeletal: No tenderness


Integumentary: No rashes


Neurological: Normal speech, Normal strength at 5/5 x4 extr, Normal tone, Normal

 affect





- Studies


Laboratory Data (last 24 hrs)





03/27/22 22:02: PT 14.0 H, INR 1.27


03/27/22 22:02: WBC 1.7 L*, Hgb 14.1, Hct 40.4, Plt Count 74 L


03/27/22 22:02: Sodium 138, Potassium 4.0, BUN 10, Creatinine 0.70, Glucose 231 

H, Magnesium 1.8, Total Bilirubin 1.8 H, AST 72 H, ALT 69, Alkaline Phosphatase 

100








Assessment and Plan





- Problems (Diagnosis)


(1) Influenza


Current Visit: Yes   Status: Acute   





(2) Fever


Current Visit: Yes   Status: Acute   


Qualifiers: 


   Fever type: due to other condition   Qualified Code(s): R50.81 - Fever 

presenting with conditions classified elsewhere   





(3) Neutropenia


Current Visit: Yes   Status: Acute   


Qualifiers: 


   Neutropenia type: due to infection   Qualified Code(s): D70.3 - Neutropenia 

due to infection   





(4) Hepatic cirrhosis due to chronic hepatitis C infection


Current Visit: No   Status: Chronic   





(5) Hypothyroidism


Current Visit: Yes   Status: Chronic   


Qualifiers: 


   Hypothyroidism type: acquired   Qualified Code(s): E03.9 - Hypothyroidism, 

unspecified   





(6) Non-insulin dependent type 2 diabetes mellitus


Current Visit: Yes   Status: Chronic   





- Plan


-Patient is positive for influenza A. his white blood cell count is decreased at

 1.7.  Will trend.


-Sepsis protocol was initially called.  Lactic acid and pro-Driss within normal 

limits.  Vital signs stabilized. will continue to monitor 


-Sepsis fluids given in the ED, will continue IV hydration at 100 cc an hour.


-Patient reported mild shortness of breath and cough.  Breathing treatments and 

Tessalon Perles ordered as needed


-Patient has a history of liver disease.  Ibuprofen as needed pain/fever


-Patient received cefepime in the ED.  Will hold off on any further antibiotics.

  Continue Tamiflu


-Continue home medications





DVT PPx: Lovenox


Code: Full


Discharge Plan: Home


Plan to discharge in: 24 Hours





- Advance Directives


Does patient have a Living Will: No


Does patient have a Durable POA for Healthcare: No





- Code Status/Comfort Care


Code Status Assessed: Yes (Full)


Critical Care: No


Time Spent Managing Pts Care (In Minutes): 70

## 2022-03-28 NOTE — P.PN
Date of Service: 03/28/22


Patient seen and examined.


He states he feels better.


No fever since hospitalization.





Diagnosis:


Influenza with influenza A.


Neutropenia


Thrombocytopenia


Fever


History of liver cirrhosis.





Plan:


Continue supportive measures.


Empiric IV cefepime until blood culture result.


Continue to monitor CBC to follow WBC and platelet counts.


Hold Metformin


Insulin sliding scale for glucose management.

## 2022-03-29 VITALS — SYSTOLIC BLOOD PRESSURE: 116 MMHG | TEMPERATURE: 98.2 F | DIASTOLIC BLOOD PRESSURE: 65 MMHG

## 2022-03-29 VITALS — OXYGEN SATURATION: 96 %

## 2022-03-29 LAB
ALBUMIN SERPL BCP-MCNC: 2.6 G/DL (ref 3.4–5)
ALP SERPL-CCNC: 91 U/L (ref 45–117)
ALT SERPL W P-5'-P-CCNC: 63 U/L (ref 12–78)
AST SERPL W P-5'-P-CCNC: 51 U/L (ref 15–37)
BUN BLD-MCNC: 9 MG/DL (ref 7–18)
GLUCOSE SERPLBLD-MCNC: 214 MG/DL (ref 74–106)
HCT VFR BLD CALC: 36.2 % (ref 39.6–49)
LYMPHOCYTES # SPEC AUTO: 0.9 K/UL (ref 0.7–4.9)
MAGNESIUM SERPL-MCNC: 1.8 MG/DL (ref 1.8–2.4)
PMV BLD: 8.2 FL (ref 7.6–11.3)
POTASSIUM SERPL-SCNC: 3.8 MMOL/L (ref 3.5–5.1)
RBC # BLD: 3.52 M/UL (ref 4.33–5.43)

## 2022-03-29 RX ADMIN — Medication SCH: at 08:32

## 2022-03-29 RX ADMIN — SODIUM CHLORIDE SCH: 0.9 INJECTION, SOLUTION INTRAVENOUS at 07:44

## 2022-03-29 RX ADMIN — OSELTAMIVIR PHOSPHATE SCH MG: 75 CAPSULE ORAL at 08:32

## 2022-03-29 RX ADMIN — SODIUM CHLORIDE SCH MLS: 0.9 INJECTION, SOLUTION INTRAVENOUS at 03:05

## 2022-03-29 RX ADMIN — HUMAN INSULIN SCH UNIT: 100 INJECTION, SOLUTION SUBCUTANEOUS at 08:31

## 2022-03-29 NOTE — P.DS
Admission Date: 03/28/22


Discharge Date: 03/29/22


Disposition: ROUTINE DISCHARGE


Discharge Condition: GOOD


Reason for Admission: Influenza


Procedures: 





Problem List


Influenza with influenza A.


transient Neutropenia, Thrombocytopenia


Fever


History of liver cirrhosis.





Brief History of Present Illness: 





66yo M, PMH: cirrhosis s/p TIPS, hypothyroidism, DM2 (non-insulin dependent).


Presented to ED with 2 days of body aches, fever, chills, cough, and headache. 

Found to have fever to 101.6 and flu A positive.





Hospital Course: 





Patient was found to be flu A positive. Chest x-ray and CT scan did not reveal 

any other acute infectious process.


He received one dose of antibiotics in the ER and was treated with Tamiflu. He 

had improvement of his symptoms and remained afebrile.


Blood cultures remained negative. He was noted to have a small decrease in his 

white blood cells, which also improved by day of discharge.


Decrease in white blood cells is most likely due to influenza infection.


Discharged home with prescriptions for Tamiflu and benzonatate for cough.





Recommend staying home and minimizing spread of the flu over the next few days.


Follow up with PCP in ~5-7 days.





Vital Signs/Physical Exam: 














Temp Pulse Resp BP Pulse Ox


 


 98.2 F   69   18   116/65   96 


 


 03/29/22 08:00  03/29/22 08:00  03/29/22 08:00  03/29/22 08:00  03/29/22 08:00








General: Alert, In no apparent distress


HEENT: PERRLA, EOMI


Neck: Supple, JVD not distended


Respiratory: Clear to auscultation bilaterally, Normal air movement, Other (+dry

cough)


Cardiovascular: No edema, Regular rate/rhythm


Gastrointestinal: Soft and benign, Non-distended


Integumentary: No significant lesion


Neurological: Normal speech, Normal affect


Laboratory Data at Discharge: 














WBC  2.2 K/uL (4.3-10.9)  L D 03/29/22  05:20    


 


Hgb  12.8 g/dL (13.6-17.9)  L  03/29/22  05:20    


 


Hct  36.2 % (39.6-49.0)  L  03/29/22  05:20    


 


Plt Count  57 K/uL (152-406)  L  03/29/22  05:20    


 


PT  14.0 SECONDS (9.5-12.5)  H  03/27/22  22:02    


 


INR  1.27   03/27/22  22:02    


 


Sodium  138 mmol/L (136-145)   03/29/22  05:20    


 


Potassium  3.8 mmol/L (3.5-5.1)   03/29/22  05:20    


 


BUN  9 mg/dL (7-18)   03/29/22  05:20    


 


Creatinine  0.52 mg/dL (0.55-1.3)  L  03/29/22  05:20    


 


Glucose  214 mg/dL ()  H  03/29/22  05:20    


 


Phosphorus  2.7 mg/dL (2.5-4.9)   03/29/22  05:20    


 


Magnesium  1.8 mg/dL (1.8-2.4)   03/29/22  05:20    


 


Total Bilirubin  1.0 mg/dL (0.2-1.0)   03/29/22  05:20    


 


AST  51 U/L (15-37)  H  03/29/22  05:20    


 


ALT  63 U/L (12-78)   03/29/22  05:20    


 


Alkaline Phosphatase  91 U/L ()   03/29/22  05:20    








Home Medications: 








Thyroid,Pork [[place] Thyroid] 60 mg PO DAILY 04/12/16 


Metformin HCl [Metformin HCl ER] 750 mg PO BID* 03/28/22 


Benzonatate 200 mg PO Q8H PRN 5 Days #15 capsule 03/29/22 


Oseltamivir [Tamiflu*] 75 mg PO BID 4 Days #8 cap 03/29/22 





New Medications: 


Benzonatate 200 mg PO Q8H PRN 5 Days #15 capsule


 PRN Reason: Cough


Oseltamivir [Tamiflu*] 75 mg PO BID 4 Days #8 cap


Physician Discharge Instructions: 


Patient was found to be flu A positive. Chest x-ray and CT scan did not reveal 

any other acute infectious process.


He received one dose of antibiotics in the ER and was treated with Tamiflu. He 

had improvement of his symptoms and remained afebrile.


Blood cultures remained negative. He was noted to have a small decrease in his 

white blood cells, which also improved by day of discharge.


Decrease in white blood cells is most likely due to influenza infection.


Discharged home with prescriptions for Tamiflu and benzonatate for cough.





Recommend staying home and minimizing spread of the flu over the next few days.


Follow up with PCP in ~5-7 days.





Followup: 


Sumanth Mccurdy MD [Primary Care Provider] - 1-2 Weeks


(call to schedule an appointment


_________________________________)


Time spent managing pt's care (in minutes): 45

## 2022-03-29 NOTE — EKG
Test Date:    2022-03-27               Test Time:    22:09:13

Technician:   ANNETTE                                     

                                                     

MEASUREMENT RESULTS:                                       

Intervals:                                           

Rate:         93                                     

ME:           162                                    

QRSD:         94                                     

QT:           366                                    

QTc:          455                                    

Axis:                                                

P:            14                                     

ME:           162                                    

QRS:          -28                                    

T:            47                                     

                                                     

INTERPRETIVE STATEMENTS:                                       

                                                     

Normal sinus rhythm

Voltage criteria for left ventricular hypertrophy

Nonspecific ST abnormality

Abnormal ECG

Compared to ECG 04/12/2016 09:43:33

ST (T wave) deviation now present

Sinus tachycardia no longer present

Left-axis deviation no longer present



Electronically Signed On 03-29-22 12:33:31 CDT by Goyo Parker

## 2022-06-28 ENCOUNTER — HOSPITAL ENCOUNTER (EMERGENCY)
Dept: HOSPITAL 97 - ER | Age: 67
Discharge: HOME | End: 2022-06-28
Payer: COMMERCIAL

## 2022-06-28 VITALS — DIASTOLIC BLOOD PRESSURE: 66 MMHG | SYSTOLIC BLOOD PRESSURE: 123 MMHG | OXYGEN SATURATION: 96 %

## 2022-06-28 VITALS — TEMPERATURE: 100.3 F

## 2022-06-28 DIAGNOSIS — U07.1: Primary | ICD-10-CM

## 2022-06-28 DIAGNOSIS — Z88.8: ICD-10-CM

## 2022-06-28 DIAGNOSIS — E11.9: ICD-10-CM

## 2022-06-28 PROCEDURE — 87804 INFLUENZA ASSAY W/OPTIC: CPT

## 2022-06-28 NOTE — EDPHYS
Physician Documentation                                                                           

 [facility]                                                                 

Name: Yariel Saini                                                                                  

Age: 67 yrs                                                                                       

Sex: Male                                                                                         

: 1955                                                                                   

MRN: [id number]                                                                                   

Arrival Date: 2022                                                                          

Time: 11:46                                                                                       

Account#: [id number]                                                                            

Bed 19                                                                                            

Private MD: Sumanth Mccurdy ED Physician Donato Morillo                                                                         

HPI:                                                                                              

                                                                                             

12:54 This 67 yrs old Male presents to ER via Ambulatory with complaints of Flu Symptoms.     rn  

12:54 The patient or guardian reports cough, flu symptoms, low-grade fever, myalgias, no      rn  

      appetite. Onset: The symptoms/episode began/occurred yesterday. Severity of symptoms:       

      At their worst the symptoms were mild, in the emergency department the symptoms are         

      unchanged. Modifying factors: The symptoms are alleviated by nothing, the symptoms are      

      aggravated by nothing. Associated signs and symptoms: Pertinent positives: fever,           

      rhinorrhea, Pertinent negatives: chest pain. The patient has not experienced similar        

      symptoms in the past. The patient has not recently seen a physician.                        

                                                                                                  

Historical:                                                                                       

- Allergies:                                                                                      

11:55 OCTREOTIDE;                                                                             ss  

11:55 Zofran;                                                                                 ss  

- PMHx:                                                                                           

11:55 Diabetes - NIDDM; Hypothyroidism; Liver Issues;                                         ss  

                                                                                                  

- Immunization history:: Client reports receiving the 2nd dose of the Covid vaccine.              

- Social history:: Smoking status: Patient denies any tobacco usage or history of.                

- Family history:: not pertinent.                                                                 

- Hospitalizations: : No recent hospitalization is reported.                                      

                                                                                                  

                                                                                                  

ROS:                                                                                              

12:54 Constitutional: + fever and chills Eyes: Negative for injury, pain, redness, and        rn  

      discharge, ENT: + congestion Cardiovascular: Negative for chest pain, palpitations, and     

      edema, Respiratory: + cough, neg for sob Abdomen/GI: Negative for abdominal pain,           

      nausea, vomiting, diarrhea, and constipation, MS/Extremity: Negative for injury and         

      deformity, Skin: Negative for injury, rash, and discoloration, Neuro: Negative for          

      numbness, tingling, and seizure.                                                            

                                                                                                  

Exam:                                                                                             

12:54 Constitutional:  This is a well developed, well nourished patient who is awake, alert,  rn  

      and in no acute distress.  Ambulatory to room without difficulty or assistance.             

      Head/Face:  Normocephalic, atraumatic. Eyes:  Pupils equal round and reactive to light,     

      extra-ocular motions intact.   Cardiovascular:  Tachycardic, regular.  No pulse             

      deficits. Respiratory:  No increased work of breathing, no retractions or nasal             

      flaring. Abdomen/GI:  Soft, non-tender Skin:  Warm, dry MS/ Extremity:  Pulses equal,       

      no cyanosis. Neuro:  Awake and alert, GCS 15                                                

                                                                                                  

Vital Signs:                                                                                      

11:54  / 74; Pulse 102; Resp 20; Temp 100.3(O); Pulse Ox 98% on R/A; Weight 78.47 kg;   ss  

      Height 5 ft. 11 in. (180.34 cm); Pain 7/10;                                                 

13:46  / 66; Pulse 102; Resp 17 S; Pulse Ox 96% on R/A;                                 jg9 

11:54 Body Mass Index 24.13 (78.47 kg, 180.34 cm)                                             ss  

                                                                                                  

MDM:                                                                                              

12:03 Patient medically screened.                                                             rn  

13:24 Differential Diagnosis: Bronchitis Influenza Upper Respiratory Infection Viral Syndrome rn  

      Other covid. Data reviewed: vital signs, nurses notes, lab test result(s), and as a         

      result, I will discharge patient. Counseling: I had a detailed discussion with the          

      patient and/or guardian regarding: the historical points, exam findings, and any            

      diagnostic results supporting the discharge/admit diagnosis, lab results, the need for      

      outpatient follow up, to return to the emergency department if symptoms worsen or           

      persist or if there are any questions or concerns that arise at home. Special               

      discussion: I discussed with the patient/guardian in detail that at this point there is     

      no indication for admission to the hospital. It is understood, however, that if the         

      symptoms persist or worsen the patient needs to return immediately for re-evaluation.       

      ED course: No oxygen requirement, wife already over her infection, denies sob, will dc      

      home with return precautions..                                                              

                                                                                                  

                                                                                             

11:56 Order name: COVID-19 SARS RT PCR (Document "Date of Onset" if Symptomatic); Complete    ss  

      Time: 13:24                                                                                 

                                                                                             

11:56 Order name: Flu; Complete Time: 13:24                                                   ss  

                                                                                                  

Administered Medications:                                                                         

12:09 Drug: Tylenol 650 mg Route: PO;                                                         ss  

13:56 Follow up: Response: No adverse reaction                                                jg9 

                                                                                                  

                                                                                                  

Disposition Summary:                                                                              

22 13:25                                                                                    

Discharge Ordered                                                                                 

      Location: Home                                                                          rn  

      Problem: new                                                                            rn  

      Symptoms: have improved                                                                 rn  

      Condition: Stable                                                                       rn  

      Diagnosis                                                                                   

        - SARS-associated coronavirus as the cause of diseases classified elsewhere           rn  

      Followup:                                                                               rn  

        - With: Private Physician                                                                  

        - When: As needed                                                                          

        - Reason: Recheck today's complaints, Re-evaluation by your physician                      

      Discharge Instructions:                                                                     

        - Discharge Summary Sheet                                                             rn  

        - COVID-19                                                                            rn  

        - Viral Illness, Adult                                                                rn  

        - Prevent the Spread of COVID-19 if You Are Sick - [facility]                                rn  

      Forms:                                                                                      

        - Medication Reconciliation Form                                                      rn  

        - Thank You Letter                                                                    rn  

        - Antibiotic Education                                                                rn  

        - Prescription Opioid Use                                                             rn  

Signatures:                                                                                       

Dispatcher [employer]                           EDDonato Waterman MD MD rn Smirch, Shelby, RN RN ss Gilmore, Jennifer RN   jg9                                                  

                                                                                                  

**************************************************************************************************

## 2022-06-28 NOTE — ER
Nurse's Notes                                                                                     

 [facility]                                                                 

Name: Yariel Saini                                                                                  

Age: 67 yrs                                                                                       

Sex: Male                                                                                         

: 1955                                                                                   

MRN: [id number]                                                                                   

Arrival Date: 2022                                                                          

Time: 11:46                                                                                       

Account#: [id number]                                                                            

Bed 19                                                                                            

Private MD: Sumanth Mccurdy                                                                           

Diagnosis: SARS-associated coronavirus as the cause of diseases classified elsewhere              

                                                                                                  

Presentation:                                                                                     

                                                                                             

11:54 Chief complaint: Patient states: Body aches and mild cough with headache that began     ss  

      yesterday. Coronavirus screen: Client denies travel out of the [place] in the last 14          

      days. Client presents with at least one sign or symptom that may indicate                   

      coronavirus-19. Ebola Screen: Patient denies exposure to infectious person. Patient         

      denies travel to an Ebola-affected area in the 21 days before illness onset. Initial        

      Sepsis Screen: Does the patient meet any 2 criteria? No. Patient's initial sepsis           

      screen is negative. Does the patient have a suspected source of infection? No.              

      Patient's initial sepsis screen is negative. Risk Assessment: Do you want to hurt           

      yourself or someone else? Patient reports no desire to harm self or others. Onset of        

      symptoms was 2022.                                                                 

11:54 Method Of Arrival: Ambulatory                                                           ss  

11:54 Acuity: ALESSANDRA 3                                                                           ss  

                                                                                                  

Triage Assessment:                                                                                

13:50 General: Appears in no apparent distress. Behavior is calm, cooperative. Pain: Denies   jg9 

      pain.                                                                                       

                                                                                                  

Historical:                                                                                       

- Allergies:                                                                                      

11:55 OCTREOTIDE;                                                                             ss  

11:55 Zofran;                                                                                 ss  

- PMHx:                                                                                           

11:55 Diabetes - NIDDM; Hypothyroidism; Liver Issues;                                         ss  

                                                                                                  

- Immunization history:: Client reports receiving the 2nd dose of the Covid vaccine.              

- Social history:: Smoking status: Patient denies any tobacco usage or history of.                

- Family history:: not pertinent.                                                                 

- Hospitalizations: : No recent hospitalization is reported.                                      

                                                                                                  

                                                                                                  

Screenin:53 Abuse screen: Denies threats or abuse. Denies injuries from another. Nutritional        jg9 

      screening: No deficits noted. Tuberculosis screening: No symptoms or risk factors           

      identified. Fall Risk None identified.                                                      

                                                                                                  

Vital Signs:                                                                                      

11:54  / 74; Pulse 102; Resp 20; Temp 100.3(O); Pulse Ox 98% on R/A; Weight 78.47 kg;   ss  

      Height 5 ft. 11 in. (180.34 cm); Pain 7/10;                                                 

13:46  / 66; Pulse 102; Resp 17 S; Pulse Ox 96% on R/A;                                 jg9 

11:54 Body Mass Index 24.13 (78.47 kg, 180.34 cm)                                               

                                                                                                  

ED Course:                                                                                        

11:46 Patient arrived in ED.                                                                  mr  

11:47 Sumanth Mccurdy is Private Physician.                                                       mr  

11:55 Triage completed.                                                                       ss  

11:55 Arm band placed on right wrist.                                                         ss  

12:03 Donato Morillo MD is Attending Physician.                                                rn  

13:15 Patient has correct armband on for positive identification. Bed in low position. Call   jg9 

      light in reach. Side rails up X 1.                                                          

13:18 Rosalie Blackmon, RN is Primary Nurse.                                                 jg9 

13:54 No provider procedures requiring assistance completed.                                  jg9 

13:55 Patient did not have IV access during this emergency room visit.                        jg9 

                                                                                                  

Administered Medications:                                                                         

12:09 Drug: Tylenol 650 mg Route: PO;                                                           

13:56 Follow up: Response: No adverse reaction                                                jg9 

                                                                                                  

                                                                                                  

Medication:                                                                                       

13:54 VIS not applicable for this client.                                                     jg9 

                                                                                                  

Outcome:                                                                                          

13:25 Discharge ordered by MD.                                                                rn  

13:54 Discharged to home                                                                      jg9 

13:54 Condition: stable                                                                           

13:54 Discharge instructions given to patient, Instructed on discharge instructions, follow   jg9 

      up and referral plans. Demonstrated understanding of instructions, follow-up care.          

13:55 Patient left the ED.                                                                    jg9 

                                                                                                  

Signatures:                                                                                       

Vitor Esther marin                                                   

Donato Morillo MD MD rn Smirch, Shelby, RN RN                                                      

Rosalie Blackmon RN RN   jg9                                                  

                                                                                                  

**************************************************************************************************

## 2024-10-08 ENCOUNTER — HOSPITAL ENCOUNTER (OUTPATIENT)
Dept: HOSPITAL 97 - ER | Age: 69
Setting detail: OBSERVATION
LOS: 1 days | Discharge: HOME | End: 2024-10-09
Attending: HOSPITALIST | Admitting: NURSE PRACTITIONER
Payer: COMMERCIAL

## 2024-10-08 VITALS — BODY MASS INDEX: 22 KG/M2

## 2024-10-08 VITALS — OXYGEN SATURATION: 99 %

## 2024-10-08 DIAGNOSIS — E05.90: Primary | ICD-10-CM

## 2024-10-08 DIAGNOSIS — E11.9: ICD-10-CM

## 2024-10-08 DIAGNOSIS — R11.0: ICD-10-CM

## 2024-10-08 DIAGNOSIS — D61.818: ICD-10-CM

## 2024-10-08 DIAGNOSIS — Z88.5: ICD-10-CM

## 2024-10-08 LAB
ANION GAP SERPL CALC-SCNC: 12.9 MEQ/L (ref 5–15)
APTT BLD: 33.1 SECONDS (ref 24.3–36.9)
BLD SMEAR INTERP: (no result)
BUN BLD-MCNC: 8 MG/DL (ref 7–18)
GLUCOSE SERPLBLD-MCNC: 180 MG/DL (ref 74–106)
HCT VFR BLD CALC: 35.8 % (ref 39.6–49)
HGB BLD-MCNC: 12.5 G/DL (ref 13.6–17.9)
INR BLD: 1.21
LYMPHOCYTES # SPEC AUTO: 0.9 K/UL (ref 0.7–4.9)
MAGNESIUM SERPL-MCNC: 1.6 MG/DL (ref 1.6–2.4)
MCH RBC QN AUTO: 35.5 PG (ref 27–35)
MCHC RBC AUTO-ENTMCNC: 35 G/DL (ref 32–36)
MCV RBC: 101.4 FL (ref 80–100)
MORPHOLOGY BLD-IMP: (no result)
NRBC # BLD: 0 10*3/UL (ref 0–0)
NRBC BLD AUTO-RTO: 0.1 % (ref 0–0)
PMV BLD: 8.2 FL (ref 7.6–11.3)
POTASSIUM SERPL-SCNC: 3.9 MEQ/L (ref 3.5–5.1)
PROTHROMBIN TIME: 13.5 SECONDS (ref 9.4–12.5)
RBC # BLD: 3.53 M/UL (ref 4.33–5.43)
SQUAMOUS URNS QL MICRO: <5 /HPF
TROPONIN I SERPL HS-MCNC: 9.6 PG/ML (ref ?–58.9)
TSH SERPL DL<=0.05 MIU/L-ACNC: < 0.005 UIU/ML (ref 0.36–3.74)
UA COMPLETE W REFLEX CULTURE PNL UR: (no result)
UA DIPSTICK W REFLEX MICRO PNL UR: (no result)
WBC # BLD AUTO: 2.9 THOU/UL (ref 4.3–10.9)

## 2024-10-08 PROCEDURE — 80053 COMPREHEN METABOLIC PANEL: CPT

## 2024-10-08 PROCEDURE — 84443 ASSAY THYROID STIM HORMONE: CPT

## 2024-10-08 PROCEDURE — 93306 TTE W/DOPPLER COMPLETE: CPT

## 2024-10-08 PROCEDURE — 70498 CT ANGIOGRAPHY NECK: CPT

## 2024-10-08 PROCEDURE — 70450 CT HEAD/BRAIN W/O DYE: CPT

## 2024-10-08 PROCEDURE — 84484 ASSAY OF TROPONIN QUANT: CPT

## 2024-10-08 PROCEDURE — 81001 URINALYSIS AUTO W/SCOPE: CPT

## 2024-10-08 PROCEDURE — 85610 PROTHROMBIN TIME: CPT

## 2024-10-08 PROCEDURE — 85730 THROMBOPLASTIN TIME PARTIAL: CPT

## 2024-10-08 PROCEDURE — 85025 COMPLETE CBC W/AUTO DIFF WBC: CPT

## 2024-10-08 PROCEDURE — 83036 HEMOGLOBIN GLYCOSYLATED A1C: CPT

## 2024-10-08 PROCEDURE — 84439 ASSAY OF FREE THYROXINE: CPT

## 2024-10-08 PROCEDURE — 99285 EMERGENCY DEPT VISIT HI MDM: CPT

## 2024-10-08 PROCEDURE — 71045 X-RAY EXAM CHEST 1 VIEW: CPT

## 2024-10-08 PROCEDURE — 82947 ASSAY GLUCOSE BLOOD QUANT: CPT

## 2024-10-08 PROCEDURE — 70553 MRI BRAIN STEM W/O & W/DYE: CPT

## 2024-10-08 PROCEDURE — 82565 ASSAY OF CREATININE: CPT

## 2024-10-08 PROCEDURE — 80048 BASIC METABOLIC PNL TOTAL CA: CPT

## 2024-10-08 PROCEDURE — 80061 LIPID PANEL: CPT

## 2024-10-08 PROCEDURE — 93005 ELECTROCARDIOGRAM TRACING: CPT

## 2024-10-08 PROCEDURE — 83735 ASSAY OF MAGNESIUM: CPT

## 2024-10-08 PROCEDURE — 36415 COLL VENOUS BLD VENIPUNCTURE: CPT

## 2024-10-08 PROCEDURE — 92610 EVALUATE SWALLOWING FUNCTION: CPT

## 2024-10-08 PROCEDURE — 70496 CT ANGIOGRAPHY HEAD: CPT

## 2024-10-08 RX ADMIN — ATORVASTATIN CALCIUM SCH MG: 20 TABLET, FILM COATED ORAL at 22:31

## 2024-10-08 RX ADMIN — SODIUM CHLORIDE SCH MLS: 0.9 INJECTION, SOLUTION INTRAVENOUS at 22:33

## 2024-10-08 RX ADMIN — HUMAN INSULIN SCH: 100 INJECTION, SOLUTION SUBCUTANEOUS at 21:00

## 2024-10-08 NOTE — RAD REPORT
EXAMINATION: CTA HEAD



CLINICAL INDICATION: Male, 69 years old. DIZZINESS



TECHNIQUE: Axial CT images were obtained through the head after intravenous contrast utilizing angiog
raphic protocol with 3D post-processing (maximum intensity projection images, volume rendered

images and/or shaded surface rendered images).  One or more of the following dose reduction technique
s were used: Automated exposure control, adjustment of the mA and/or kV according to patient size,

and/or iterative reconstruction. Unless otherwise specified, incidental findings do not require dedic
ated imaging follow-up. 



COMPARISON: No prior exam.



FINDINGS:

ICA: The petrous, cavernous, and supraclinoid segments of the bilateral internal carotid arteries are
 normal. The ophthalmic artery origins are visualized and normal. The posterior communicating

arteries are patent.



GAVI: Anterior cerebral arteries are normal bilaterally.  The anterior communicating artery is patent.




MCA: Middle cerebral arteries are normal bilaterally.    



PCA: Posterior cerebral arteries are normal bilaterally.



Vertebrobasilar: The left vertebral artery is patent. Right vertebral artery is diminutive. The basil
ar artery is normal in appearance. 



3D images confirm these findings.



IMPRESSION:

No evidence of large vessel occlusion or critical stenosis. Right vertebral artery is diminutive, pos
sibly on a congenital/developmental basis.



Reported By: Jossue Rodriguez 

Electronically Signed:  10/8/2024 6:56 PM

## 2024-10-08 NOTE — P.HP
Certification for Inpatient


Patient admitted to: Observation


With expected LOS: <2 Midnights


Practitioner: I am a practitioner with admitting privileges, knowledge of 

patient current condition, hospital course, and medical plan of care.


Services: Services provided to patient in accordance with Admission requirements

found in Title 42 Section 412.3 of the Code of Federal Regulations





Patient History


Date of Service: 10/08/24


Reason for admission: dizziness


History of Present Illness: 





69-year-old male with past medical history of diabetes, hypothyroidism presented

to the emergency room with complaints of dizziness since 3:30 PM today.  He also

reported some nausea but denies vomiting.  Reported that the room felt like a 

spinning.  Denies any falls head trauma fevers chills r focal weaknes difficulty

with speech or swallowing.  Neurology was contacted in the emergency room 

recommended admission and stroke workup


Allergies





codeine Adverse Reaction (Verified 04/12/16 11:56)


   Nausea/Vomiting


hydrocodone Adverse Reaction (Verified 04/12/16 17:20)


   Nausea/Vomiting


octreotide [From Sandostatin] Adverse Reaction (Verified 04/13/16 19:52)


   Hives/Rash


ondansetron [From Zofran (as hydrochloride)] Adverse Reaction (Verified 04/13/16

19:52)


   Hives/Rash





Home Medications: 








Thyroid,Pork [[place] Thyroid] 60 mg PO DAILY 04/12/16 


Metformin HCl [Metformin HCl ER] 750 mg PO BID* 03/28/22 


Benzonatate 200 mg PO Q8H PRN 5 Days #15 capsule 03/29/22 


Oseltamivir [Tamiflu*] 75 mg PO BID 4 Days #8 cap 03/29/22 








- Past Medical/Surgical History


Diabetic: Yes


-: Hep C


-: Thyroid Disorder


-: Type 2 Diabetes- Non Insulin Dependent 


-: Cirrhosis 


-: TIPS 


Psychosocial/ Personal History: Patient lives at home with his wife.





- Family History


  ** Father


-: Heart disease, Diabetes





  ** Mother


-: Heart disease, Hypertension





- Social History


Alcohol use: No


CD- Drugs: No


Caffeine use: Yes





Review of Systems


10-point ROS is otherwise unremarkable


Gastrointestinal: Nausea





Physical Examination





- Physical Exam


General: Alert, In no apparent distress, Oriented x3


HEENT: Atraumatic, Normocephalic


Respiratory: Clear to auscultation bilaterally, Normal air movement


Cardiovascular: No edema, Normal pulses, Regular rate/rhythm


Gastrointestinal: Normal bowel sounds, Soft and benign


Integumentary: No rashes


Neurological: Normal speech, Normal strength at 5/5 x4 extr, Sensation intact, 

Cranial nerves 3-12 intact





- Studies


Laboratory Data (last 24 hrs)











  10/08/24 10/08/24 10/08/24





  18:41 18:41 18:41


 


WBC   2.90 L 


 


Hgb   12.5 L 


 


Hct   35.8 L 


 


Plt Count   65 L 


 


PT  13.5 H  


 


INR  1.21  


 


APTT  33.1  


 


Sodium    136


 


Potassium    3.9


 


BUN    8


 


Creatinine    0.55 L


 


Glucose    180 H


 


Magnesium    1.6











Assessment and Plan





- Problems (Diagnosis)


(1) Dizziness


Current Visit: Yes   Status: Acute   





- Plan


69-year-old male with past medical history of diabetes, hypothyroidism presented

 to the emergency room with complaints of dizziness 











#dizziness





#NIDDM





#hypothyroidism





Plan:


1. admit observation with telemetry


2. MRI brain, ECHO, FLP, AIC


3. continue ASA, statin, permissive HTN


4. PT OT ST


5. Neuro called in ED


6. check TSH


7. FSBS, SSI











- Advance Directives


Does patient have a Living Will: No


Does patient have a Durable POA for Healthcare: No

## 2024-10-08 NOTE — RAD REPORT
EXAMINATION: ONE VIEW CHEST XR



CLINICAL INDICATION: Male, 69 years old.,Dizziness



TECHNIQUE: Frontal chest projection is submitted. Examination is limited by patient positioning and t
echnique.



COMPARISON: 3/27/2022



FINDINGS:

The lungs are well inflated and clear. Mild hyperinflation. No pneumothorax or sizable effusion. The 
heart is normal in size.



IMPRESSION: 



No acute intrathoracic abnormalities. 







Reported By: Jossue Rodriguez 

Electronically Signed:  10/8/2024 7:14 PM

## 2024-10-08 NOTE — EDPHYS
Physician Documentation                                                                           

 [facility] [place]                                                                 

Name: Yariel Saini                                                                                  

Age: 69 yrs                                                                                       

Sex: Male                                                                                         

: 1955                                                                                   

MRN: [id number]                                                                                   

Arrival Date: 10/08/2024                                                                          

Time: 17:54                                                                                       

Account#: [id number]                                                                            

Bed 4                                                                                             

Private MD:                                                                                       

ED Physician Arnel Murphy                                                                         

HPI:                                                                                              

10/08                                                                                             

18:10 This 69 yrs old Male presents to ER via Ambulatory with complaints of Dizziness, Nausea.ms3 

18:10 69-year-old male with past medical history of diabetes, hypothyroidism, liver problems  ms3 

      presents to the emergency department for dizziness that began at 3:30 PM. Patient           

      denies any alleviating or inciting factors. Patient denies numbness, weakness..             

                                                                                                  

Historical:                                                                                       

- Allergies:                                                                                      

18:05 OCTREOTIDE;                                                                             hb  

18:05 Zofran;                                                                                 hb  

- Home Meds:                                                                                      

18:05 [place] Thyroid 60 mg Oral tab 60 mg after meals and before bedtime for Hypothyroidism   hb  

      [Active]; Metformin Oral [Active];                                                          

- PMHx:                                                                                           

18:05 Diabetes - NIDDM; Hypothyroidism; Liver Issues;                                         hb  

                                                                                                  

- Immunization history:: Adult Immunizations up to date.                                          

- Infectious Disease History:: Denies.                                                            

- Social history:: Smoking status: Patient denies any tobacco usage or history of.                

                                                                                                  

                                                                                                  

ROS:                                                                                              

18:10 Constitutional: Negative for fever, and chills. Cardiovascular: Negative for chest      ms3 

      pain, and palpitations. Respiratory: Negative for shortness of breath, cough, wheezing,     

      and pleuritic chest pain, Abdomen/GI: Negative for abdominal pain, nausea, vomiting,        

      diarrhea, and constipation, MS/Extremity: Negative for injury and deformity, Skin:          

      Negative for injury, rash, and discoloration,                                               

18:10 Neuro: Positive for dizziness,                                                              

                                                                                                  

Exam:                                                                                             

18:10 Constitutional:  This is a well developed, well nourished patient who is awake, alert,  ms3 

      and in no acute distress. Chest/axilla:  Normal chest wall appearance and motion.           

      Nontender with no deformity.   Cardiovascular:  Regular rate and rhythm with a normal       

      S1 and S2.  No gallops, murmurs, or rubs.  Normal PMI, no JVD.  No pulse deficits.          

      Respiratory:  Lungs have equal breath sounds bilaterally, clear to auscultation and         

      percussion.  No rales, rhonchi or wheezes noted.  No increased work of breathing, no        

      retractions or nasal flaring. Abdomen/GI:  Soft, non-tender, with normal bowel sounds.      

      No distension or tympany.  No guarding or rebound.  No evidence of tenderness               

      throughout. Skin:  Warm, dry with normal turgor.  Normal color with no rashes, no           

      lesions, and no evidence of cellulitis.                                                     

18:10 Neuro: Orientation: is normal, to person, place, time \T\ situation. Mentation: is          

      normal, Memory: is normal, Cranial nerves: CN I not tested, CN II- XII are normal as        

      tested, Cerebellar function: is grossly normal, normal finger to nose testing, Motor:       

      is normal, Sensation: is normal,                                                            

19:08 ECG was reviewed by the Attending Physician.                                            ms3 

                                                                                                  

Vital Signs:                                                                                      

18:03  / 80; Pulse 84; Resp 16; Temp 98.5; Pulse Ox 99% on R/A; Weight 79.38 kg; Height hb  

      5 ft. 10 in. ; Pain 0/10;                                                                   

19:30  / 85; Pulse 79; Resp 18 S; Pulse Ox 99% on R/A;                                  br2 

20:30  / 86; Pulse 74; Resp 18; Pulse Ox 99% ;                                          br2 

21:30  / 86; Pulse 74; Resp 18 S; Pulse Ox 99% on R/A;                                  br2 

18:03 Body Mass Index 25.11 (79.38 kg, 177.8 cm)                                              hb  

18:03 Pain Scale: Adult                                                                       hb  

                                                                                                  

NIH Stroke Scale Scores:                                                                          

18:06 NIHSS Score: 0                                                                          hb  

18:10 NIHSS Score: 0                                                                          ms3 

18:45 NIHSS Score: 0                                                                          iw  

                                                                                                  

MDM:                                                                                              

18:12 Patient medically screened.                                                             ms3 

18:49 ED course: Patient states his dizziness has improved since arrival to the Emergency     ms3 

      Department. Awaiting CT Head read at this time. CT Head images reviewed by me does not      

      show ICH. NIH remains 0.                                                                    

18:52 ED course: Discussed CT Head with Dr Rodriguez and CT Head negative.                       ms3 

18:56 Differential diagnosis: cardiac arrhythmia, CVA, TIA. Data reviewed: vital signs,       ms3 

      nurses notes, lab test result(s), EKG, radiologic studies, and as a result, I will          

      admit patient.                                                                              

19:00 ED course: Discussed risks and benefits of TNKase with patient and patient declines     ms3 

      administration of TNKase. Risks and benefits discussed with patient and his wife.           

      Discussed case with Dr Sanders and he recommends ASA, Statin and Follic acid..             

                                                                                                  

10/08                                                                                             

18:09 Order name: Basic Metabolic Panel; Complete Time: 19:19                                 ms3 

10/08                                                                                             

18:09 Order name: CBC with Diff                                                               ms3 

10/08                                                                                             

18:09 Order name: High Sensitivity Troponin; Complete Time: 19:19                             ms3 

10/08                                                                                             

18:09 Order name: Magnesium; Complete Time: 19:19                                             ms3 

10/08                                                                                             

18:09 Order name: Protime (+inr); Complete Time: 19:06                                        ms3 

10/08                                                                                             

18:09 Order name: Ptt, Activated; Complete Time: 19:06                                        ms3 

10/08                                                                                             

18:53 Order name: Glucose, Ancillary Testing; Complete Time: 19:06                            EDMS

10/08                                                                                             

20:07 Order name: CBC Smear Scan                                                              EDMS

10/08                                                                                             

20:27 Order name: Hemoglobin A1c                                                              EDMS

10/08                                                                                             

20:27 Order name: T4 Free                                                                     EDMS

10/08                                                                                             

20:27 Order name: Thyroid Stimulating Hormone                                                 EDMS

10/08                                                                                             

20:27 Order name: Urinalysis w/ reflexes                                                      EDMS

10/08                                                                                             

20:27 Order name: CBC with Automated Diff                                                     EDMS

10/08                                                                                             

20:27 Order name: CBC with Automated Diff                                                     EDMS

10/08                                                                                             

20:27 Order name: Comprehensive Metabolic Panel                                               EDMS

10/08                                                                                             

20:27 Order name: Comprehensive Metabolic Panel                                               EDMS

10/08                                                                                             

20:27 Order name: Lipid Profile                                                               EDMS

10/08                                                                                             

20:27 Order name: Lipid Profile                                                               EDMS

10/08                                                                                             

18:09 Order name: CT Neck Angio; Complete Time: 19:06                                         ms3 

10/08                                                                                             

18:09 Order name: CT Stroke Brain w/o Contrast; Complete Time: 19:06                          ms3 

10/08                                                                                             

18:09 Order name: Stroke CXR 1 View; Complete Time: 19:19                                     ms3 

10/08                                                                                             

18:15 Order name: Head angio; Complete Time: 19:06                                            EDMS

10/08                                                                                             

20:27 Order name: Echo with Doppler                                                           EDMS

10/08                                                                                             

20:27 Order name: IRF Screen                                                                  EDMS

10/08                                                                                             

20:27 Order name: Physical Therapy Consult                                                    EDMS

10/08                                                                                             

20:27 Order name: Speech Therapy Consult                                                      EDMS

10/08                                                                                             

18:09 Order name: Accucheck; Complete Time: 18:44                                             ms3 

10/08                                                                                             

18:09 Order name: Cardiac monitoring; Complete Time: 18:47                                    ms3 

10/08                                                                                             

18:09 Order name: EKG - Nurse/Tech; Complete Time: 18:47                                      ms3 

10/08                                                                                             

18:09 Order name: IV Saline Lock; Complete Time: 18:44                                        ms3 

10/08                                                                                             

18:09 Order name: Labs collected and sent; Complete Time: 18:44                               ms3 

10/08                                                                                             

18:09 Order name: NPO; Complete Time: 18:45                                                   ms3 

10/08                                                                                             

18:09 Order name: O2 Per Protocol; Complete Time: 18:44                                       ms3 

10/08                                                                                             

18:09 Order name: O2 Sat Monitoring; Complete Time: 18:44                                     ms3 

10/08                                                                                             

18:09 Order name: Stroke Swallow Screen; Complete Time: 18:44                                 ms3 

                                                                                                  

EC:08 Rate is 86 beats/min. Rhythm is regular. Left axis deviation noted. MT interval is      ms3 

      normal. QRS interval is normal. Clinical impression: NSR w/ Non-specific ST/T Changes.      

      Interpreted by me. Reviewed by me.                                                          

                                                                                                  

Administered Medications:                                                                         

18:45 Drug: Meclizine PO 50 mg PO once Route: PO;                                             ss  

20:02 Follow up: Response: No adverse reaction; RASS: Alert and Calm (0)                      br2 

19:53 Drug: Aspirin PO Chewable Tablet 162 mg PO once Route: PO;                              br2 

22:07 Follow up: Response: No adverse reaction                                                br2 

19:53 Drug: foLIC Acid PO 1 mg PO once Route: PO;                                             br2 

22:07 Follow up: Response: No adverse reaction                                                br2 

21:44 Not Given (Physician Discretion): [id number] mg PO once                              lg3 

                                                                                                  

                                                                                                  

Disposition Summary:                                                                              

10/08/24 19:08                                                                                    

Hospitalization Ordered                                                                           

 Notes:       Hospitalization Status: Observation                                                   
  ms3

      Provider: Lupis Sosa                                                               ms3 

      Location: Telemetry/MedSurg (observation)                                               ms3 

      Condition: Stable                                                                       ms3 

      Problem: new                                                                            ms3 

      Symptoms: are unchanged                                                                 ms3 

      Bed/Room Type: Standard                                                                 ms3 

      Room Assignment: 230(10/08/24 20:48)                                                    rv1 

      Diagnosis                                                                                   

        - Dizziness                                                                           ms3 

        - Anemia, unspecified                                                                 ms3 

        - Essential (primary) hypertension                                                    ms3 

      Forms:                                                                                      

        - Medication Reconciliation Form                                                      ms3 

        - SBAR form                                                                           ms3 

        - Leadership Thank You Letter                                                         ms3 

                                                                                                  

NIH Stroke Scale - NIH Stroke Score                                                               

Date: 10/08/2024                                                                                  

Time: 18:06                                                                                       

Total Score = 0                                                                                   

  10. Dysarthria (speech clarity - read or repeat words) - 0(Normal)                              

  11. Extinction and Inattention (visual/tactile/auditory/spatial/personal) - 0(No                

      abnormality)                                                                                

  1a. Level of Consciousness (LOC) - 0(Alert)                                                     

  1b. Level of Consciousness (LOC) (Month \T\ Age) - 0(Both)                                      

  1c. LOC Commands (Open \T\ Closes Eyes/) - 0(Both)                                          

   2. Best Gaze (Lateral Gaze Paresis) - 0(Normal)                                                

   3. Visual Field Loss - 0(No visual loss)                                                       

   4. Facial Palsy - 0(Normal)                                                                    

  5a. Left Arm: Motor (10-second hold) - 0(No drift)                                              

  5b. Right Arm: Motor (10-second hold) - 0(No drift)                                             

  6a. Left Leg: Motor (5-second hold - always test supine) - 0(No drift)                          

  6b. Right Leg: Motor (5-second hold - always test supine) - 0(No drift)                         

   7. Limb Ataxia (finger/nose \T\ heel/shin - test with eyes open) - 0(Absent)                   

   8. Sensory Loss (pinprick arms/legs/face) - 0(Normal)                                          

   9. Best Language: Aphasia (description/naming/reading) - 0(No aphasia)                         

Initials:                                                                                       

                                                                                                  

                                                                                                  

NIH Stroke Scale - NIH Stroke Score                                                               

Date: 10/08/2024                                                                                  

Time: 18:10                                                                                       

Total Score = 0                                                                                   

  10. Dysarthria (speech clarity - read or repeat words) - 0(Normal)                              

  11. Extinction and Inattention (visual/tactile/auditory/spatial/personal) - 0(No                

      abnormality)                                                                                

  1a. Level of Consciousness (LOC) - 0(Alert)                                                     

  1b. Level of Consciousness (LOC) (Month \T\ Age) - 0(Both)                                      

  1c. LOC Commands (Open \T\ Closes Eyes/) - 0(Both)                                          

   2. Best Gaze (Lateral Gaze Paresis) - 0(Normal)                                                

   3. Visual Field Loss - 0(No visual loss)                                                       

   4. Facial Palsy - 0(Normal)                                                                    

  5a. Left Arm: Motor (10-second hold) - 0(No drift)                                              

  5b. Right Arm: Motor (10-second hold) - 0(No drift)                                             

  6a. Left Leg: Motor (5-second hold - always test supine) - 0(No drift)                          

  6b. Right Leg: Motor (5-second hold - always test supine) - 0(No drift)                         

   7. Limb Ataxia (finger/nose \T\ heel/shin - test with eyes open) - 0(Absent)                   

   8. Sensory Loss (pinprick arms/legs/face) - 0(Normal)                                          

   9. Best Language: Aphasia (description/naming/reading) - 0(No aphasia)                         

Initials: ms3                                                                                     

                                                                                                  

                                                                                                  

NIH Stroke Scale - NIH Stroke Score                                                               

Date: 10/08/2024                                                                                  

Time: 18:45                                                                                       

Total Score = 0                                                                                   

  10. Dysarthria (speech clarity - read or repeat words) - 0(Normal)                              

  11. Extinction and Inattention (visual/tactile/auditory/spatial/personal) - 0(No                

      abnormality)                                                                                

  1a. Level of Consciousness (LOC) - 0(Alert)                                                     

  1b. Level of Consciousness (LOC) (Month \T\ Age) - 0(Both)                                      

  1c. LOC Commands (Open \T\ Closes Eyes/) - 0(Both)                                          

   2. Best Gaze (Lateral Gaze Paresis) - 0(Normal)                                                

   3. Visual Field Loss - 0(No visual loss)                                                       

   4. Facial Palsy - 0(Normal)                                                                    

  5a. Left Arm: Motor (10-second hold) - 0(No drift)                                              

  5b. Right Arm: Motor (10-second hold) - 0(No drift)                                             

  6a. Left Leg: Motor (5-second hold - always test supine) - 0(No drift)                          

  6b. Right Leg: Motor (5-second hold - always test supine) - 0(No drift)                         

   7. Limb Ataxia (finger/nose \T\ heel/shin - test with eyes open) - 0(Absent)                   

   8. Sensory Loss (pinprick arms/legs/face) - 0(Normal)                                          

   9. Best Language: Aphasia (description/naming/reading) - 0(No aphasia)                         

Initials: iw                                                                                      

                                                                                                  

Signatures:                                                                                       

Dispatcher [employer]                           EDMS                                                 

Haleigh Clinton RN                   DINORAH   ss                                                   

Alysha Ortiz RN                     RN                                                      

rAnel Murphy DO                        DO   ms3                                                  

Surekha Lopez                            rv1                                                  

Shannon Guerrero RN                     RN   br2                                                  

Savanna Ariza RN   lg3                                                  

                                                                                                  

Corrections: (The following items were deleted from the chart)                                    

18:10 18:10 Neck Angio+CT.RAD.BRZ ordered. EDMS                                       EDMS        

18:10 18:10 CT-STROKE BRAIN W/O CONTRAST+CT.RAD.BRZ ordered. EDMS                     EDMS        

18:10 18:10 Chest Single View+RAD.RAD.BRZ ordered. EDMS                               EDMS        

20:48 19:08 ms3                                                                       rv1         

                                                                                                  

**************************************************************************************************

## 2024-10-08 NOTE — RAD REPORT
EXAMINATION: Neck Angio



CLINICAL INDICATION: Male, 69 years old. [facility] MAIN

 Dizziness

 Bed:



TECHNIQUE: Axial CT images were obtained from the aortic arch to the skull base after intravenous con
trast utilizing angiographic protocol. Multiplanar reformats, as well as 3D post-processing

(maximum intensity projection images, volume rendered images and/or shaded surface rendered images) w
ere generated and reviewed. One or more of the following dose reduction techniques were used:

Automated exposure control, adjustment of the mA and/or kV according to patient size, and/or iterativ
e reconstruction. Unless otherwise specified, incidental findings do not require dedicated imaging

follow-up. 





COMPARISON: No prior exam.





FINDINGS:



AORTA: The imaged aortic arch is normal. Normal three-vessel configuration of the arch.



CCA: No artifact The common carotid arteries are patent and normal in caliber.



ICA/ECA: Bilateral internal and external carotid arteries are patent. There is no significant interna
l carotid artery stenosis.



VERTEBRAL: The cervical vertebral arteries are patent to the skull base. Right vertebral artery is di
minutive, with a small fenestration at the level of dural entry with further diminutive appearance

of the intradural segment. Vertebral arteries are patent.



SOFT TISSUE: No significant neck soft tissue abnormalities. The visualized lung apices are clear.



3D images confirm these findings.





IMPRESSION: 



No significant flow abnormality of the neck vessels is identified. Diminutive appearance of the right
 vertebral artery as above.







NASCET criteria used to quantify ICA stenosis, with the following grading scheme:



Mild 0-49% stenosis 

Moderate 50-69% stenosis 

Severe 70-99% stenosis





Reference: [employer] Symptomatic Carotid Endarterectomy Trial Collabor[employer]; Maurisio REECE, Mag
 DW, Leyla RB, et al. Beneficial effect of carotid endarterectomy in symptomatic patients with

high-grade carotid stenosis. N Engl J Med. 1991 Aug 15;325(7):445-53. 



Reported By: Jossue Rodriguez 

Electronically Signed:  10/8/2024 6:58 PM

## 2024-10-08 NOTE — RAD REPORT
EXAM: Ct Stroke Brain Wo Cont



HISTORY: STROKE ALERT



COMPARISON: None



TECHNIQUE: Multiple contiguous axial images were obtained for a CT of the brain without contrast. Sag
ittal and coronal reformats were performed.

  One or more of the following dose reduction techniques were used: Automated exposure control, adjus
tment of the mA and kV according to patient size, and iterative reconstruction. Unless otherwise

specified, incidental findings do not require dedicated imaging follow-up. 





FINDINGS:

No evidence of hydrocephalus, intracranial hemorrhage, or extra-axial fluid collection.

 The brain is normal in morphology.



The calvarium is intact. The visualized paranasal sinuses and mastoid air cells are essentially clear
.



IMPRESSION: 



No evidence of acute intracranial abnormality. 







The findings were verbally communicated via telephone to Arnel Murphy on 10/8/2024 6:52 PM.



Reported By: Jossue Rodriguez 

Electronically Signed:  10/8/2024 6:53 PM

## 2024-10-08 NOTE — ER
Nurse's Notes                                                                                     

 [facility]                                                                 

Name: Yariel Saini                                                                                  

Age: 69 yrs                                                                                       

Sex: Male                                                                                         

: 1955                                                                                   

MRN: [id number]                                                                                   

Arrival Date: 10/08/2024                                                                          

Time: 17:54                                                                                       

Account#: [id number]                                                                            

Bed 4                                                                                             

Private MD:                                                                                       

Diagnosis: Dizziness;Anemia, unspecified;Essential (primary) hypertension                         

                                                                                                  

Presentation:                                                                                     

10/08                                                                                             

18:03 Chief complaint: Dizziness and nausea that started at 1530 today. VAN NEGATIVE.         hb  

      Coronavirus screen: At this time, the client does not indicate any symptoms associated      

      with coronavirus-19. Ebola Screen: No symptoms or risks identified at this time.            

      Initial Sepsis Screen: Does the patient meet any 2 criteria? No. Patient's initial          

      sepsis screen is negative. Does the patient have a suspected source of infection? No.       

      Patient's initial sepsis screen is negative. Risk Assessment: Do you want to hurt           

      yourself or someone else? Patient reports no desire to harm self or others. Onset of        

      symptoms was 2024.                                                              

18:03 Method Of Arrival: Ambulatory                                                           hb  

18:03 Acuity: ALESSANDRA 2                                                                           hb  

                                                                                                  

Historical:                                                                                       

- Allergies:                                                                                      

18:05 OCTREOTIDE;                                                                             hb  

18:05 Zofran;                                                                                 hb  

- Home Meds:                                                                                      

18:05 [place] Thyroid 60 mg Oral tab 60 mg after meals and before bedtime for Hypothyroidism   hb  

      [Active]; Metformin Oral [Active];                                                          

- PMHx:                                                                                           

18:05 Diabetes - NIDDM; Hypothyroidism; Liver Issues;                                         hb  

                                                                                                  

- Immunization history:: Adult Immunizations up to date.                                          

- Infectious Disease History:: Denies.                                                            

- Social history:: Smoking status: Patient denies any tobacco usage or history of.                

                                                                                                  

                                                                                                  

Screenin:43 Abuse screen: Denies threats or abuse. Denies injuries from another. Nutritional        ss  

      screening: No deficits noted. [place] Protocol Exclusion Criteria: Exclusion           

      Criteria Result: Proceed Brief Cognitive Screen What is your name? Normal, Where are        

      you right now? Normal, What year is it? Normal. Oral Mechanism Examination Facial           

      Symmetry: Normal, Motion: Normal, Lip Closure: Normal, Oral Mechanism Result: Normal. 3     

      oz Water Swallow Challenge: Pt able to drink all water without stopping, coughing,          

      choking or throat clearing: Yes Result: PASS MD Notified: Arnel Murphy DO.                   

18:49 Tuberculosis screening: No symptoms or risk factors identified.                         iw  

                                                                                                  

Assessment:                                                                                       

18:02 Reassessment: Dr. Murphy in triage to assess pt.                                          hb  

18:08 Reassessment: CODE STROKE CALLED.                                                       hb  

18:21 Reassessment: pt remains in CT at this time.                                            iw  

18:43 [place] Protocol Exclusion Criteria: Brief Cognitive Screen What is your name?     iw  

      Normal, Where are you right now? Normal, What year is it? Normal. Oral Mechanism            

      Examination Facial Symmetry: Normal, Motion: Normal, Lip Closure: Normal, Oral              

      Mechanism Result: Normal. 3 oz Water Swallow Challenge: Pt able to drink all water          

      without stopping, coughing, choking or throat clearing: Yes Result: PASS MD Notified:       

      Arnel Murphy DO.                                                                             

18:45 General: Appears in no apparent distress. comfortable, Behavior is calm, cooperative.   iw  

      Neuro: Level of Consciousness is awake, alert, obeys commands, Oriented to person,          

      place, time, situation,  are equal bilaterally Moves all extremities. Full             

      function Speech is normal, Facial symmetry appears normal, Reports dizziness.               

      Cardiovascular: Patient's skin is warm and dry. Respiratory: Respiratory effort is          

      even, unlabored, Respiratory pattern is regular, symmetrical. GI: Abdomen is flat,          

      non-distended, Reports nausea. Derm: Skin is intact.                                        

18:45 VAN Scoring: Arm Drift: Patients demonstrates NO arm weakness. Patient is VAN Negative. iw  

18:49 Reassessment: pt reports rapidly improving symptoms.                                    iw  

18:49 TNKase (Tenecteplase) Screening: Contraindications: Rapidly improving condition or      iw  

      minor deficit: Yes.                                                                         

19:15 Reassessment: Patient and/or family updated on plan of care and expected duration. Pain br2 

      level reassessed. Patient is alert, oriented x 3, equal unlabored respirations, skin        

      warm/dry/pink. Patient states feeling better. Patient states symptoms have improved.        

      Pain: Denies pain. Neuro: Patrick Agitation-Sedation Scale (RASS): 0 - Alert and Calm      

      Level of Consciousness is awake, Oriented to person, place, time, Moves all                 

      extremities. Speech is normal, Facial symmetry appears normal.                              

                                                                                                  

Vital Signs:                                                                                      

18:03  / 80; Pulse 84; Resp 16; Temp 98.5; Pulse Ox 99% on R/A; Weight 79.38 kg; Height hb  

      5 ft. 10 in. ; Pain 0/10;                                                                   

19:30  / 85; Pulse 79; Resp 18 S; Pulse Ox 99% on R/A;                                  br2 

20:30  / 86; Pulse 74; Resp 18; Pulse Ox 99% ;                                          br2 

21:30  / 86; Pulse 74; Resp 18 S; Pulse Ox 99% on R/A;                                  br2 

18:03 Body Mass Index 25.11 (79.38 kg, 177.8 cm)                                              hb  

18:03 Pain Scale: Adult                                                                       hb  

                                                                                                  

NIH Stroke Scale Scores:                                                                          

18:06 NIHSS Score: 0                                                                          hb  

18:10 NIHSS Score: 0                                                                          ms3 

18:45 NIHSS Score: 0                                                                          iw  

                                                                                                  

ED Course:                                                                                        

17:58 Patient arrived in ED.                                                                  mr  

18:04 Arnel Murphy DO is Attending Physician.                                                ms3 

18:05 Triage completed.                                                                       hb  

18:05 Arm band placed on.                                                                     hb  

18:30 CT Stroke Brain w/o Contrast In Process Unspecified.                                    EDMS

18:35 CT Neck Angio In Process Unspecified.                                                   EDMS

18:35 Head angio In Process Unspecified.                                                      EDMS

18:44 Kayley Colindres, RN is Primary Nurse.                                                   iw  

18:46 Patient has correct armband on for positive identification. Placed in gown. Bed in low  iw  

      position. Call light in reach. Side rails up X2. Provided Education on: lab draw.           

      Client placed on continuous cardiac and pulse oximetry monitoring. NIBP monitoring          

      applied. Cardiac monitor on.                                                                

18:46 Initial lab(s) drawn, by me, sent to lab. Inserted saline lock: 22 gauge in right       iw  

      antecubital area, using aseptic technique. Blood collected. Flushed with 10 mL NS.          

19:00 Report received from ANTHONY.                                                  br2 

19:06 Stroke CXR 1 View In Process Unspecified.                                               EDMS

19:07 Lupis Sosa MD is Hospitalizing Provider.                                          ms3 

                                                                                                  

Administered Medications:                                                                         

18:45 Drug: Meclizine PO 50 mg PO once Route: PO;                                             ss  

20:02 Follow up: Response: No adverse reaction; RASS: Alert and Calm (0)                      br2 

19:53 Drug: Aspirin PO Chewable Tablet 162 mg PO once Route: PO;                              br2 

22:07 Follow up: Response: No adverse reaction                                                br2 

19:53 Drug: foLIC Acid PO 1 mg PO once Route: PO;                                             br2 

22:07 Follow up: Response: No adverse reaction                                                br2 

21:44 Not Given (Physician Discretion): npxfadembop62 mg PO once                              lg3 

                                                                                                  

                                                                                                  

Medication:                                                                                       

18:46 VIS not applicable for this client.                                                     iw  

                                                                                                  

Outcome:                                                                                          

19:08 Decision to Hospitalize by Provider.                                                    ms3 

22:08 Patient left the ED.                                                                    br2 

                                                                                                  

                                                                                                  

NIH Stroke Scale - NIH Stroke Score                                                               

Date: 10/08/2024                                                                                  

Time: 18:06                                                                                       

Total Score = 0                                                                                   

  10. Dysarthria (speech clarity - read or repeat words) - 0(Normal)                              

  11. Extinction and Inattention (visual/tactile/auditory/spatial/personal) - 0(No                

      abnormality)                                                                                

  1a. Level of Consciousness (LOC) - 0(Alert)                                                     

  1b. Level of Consciousness (LOC) (Month \T\ Age) - 0(Both)                                      

  1c. LOC Commands (Open \T\ Closes Eyes/) - 0(Both)                                          

   2. Best Gaze (Lateral Gaze Paresis) - 0(Normal)                                                

   3. Visual Field Loss - 0(No visual loss)                                                       

   4. Facial Palsy - 0(Normal)                                                                    

  5a. Left Arm: Motor (10-second hold) - 0(No drift)                                              

  5b. Right Arm: Motor (10-second hold) - 0(No drift)                                             

  6a. Left Leg: Motor (5-second hold - always test supine) - 0(No drift)                          

  6b. Right Leg: Motor (5-second hold - always test supine) - 0(No drift)                         

   7. Limb Ataxia (finger/nose \T\ heel/shin - test with eyes open) - 0(Absent)                   

   8. Sensory Loss (pinprick arms/legs/face) - 0(Normal)                                          

   9. Best Language: Aphasia (description/naming/reading) - 0(No aphasia)                         

Initials:                                                                                       

                                                                                                  

                                                                                                  

NIH Stroke Scale - NIH Stroke Score                                                               

Date: 10/08/2024                                                                                  

Time: 18:10                                                                                       

Total Score = 0                                                                                   

  10. Dysarthria (speech clarity - read or repeat words) - 0(Normal)                              

  11. Extinction and Inattention (visual/tactile/auditory/spatial/personal) - 0(No                

      abnormality)                                                                                

  1a. Level of Consciousness (LOC) - 0(Alert)                                                     

  1b. Level of Consciousness (LOC) (Month \T\ Age) - 0(Both)                                      

  1c. LOC Commands (Open \T\ Closes Eyes/) - 0(Both)                                          

   2. Best Gaze (Lateral Gaze Paresis) - 0(Normal)                                                

   3. Visual Field Loss - 0(No visual loss)                                                       

   4. Facial Palsy - 0(Normal)                                                                    

  5a. Left Arm: Motor (10-second hold) - 0(No drift)                                              

  5b. Right Arm: Motor (10-second hold) - 0(No drift)                                             

  6a. Left Leg: Motor (5-second hold - always test supine) - 0(No drift)                          

  6b. Right Leg: Motor (5-second hold - always test supine) - 0(No drift)                         

   7. Limb Ataxia (finger/nose \T\ heel/shin - test with eyes open) - 0(Absent)                   

   8. Sensory Loss (pinprick arms/legs/face) - 0(Normal)                                          

   9. Best Language: Aphasia (description/naming/reading) - 0(No aphasia)                         

Initials: ms3                                                                                     

                                                                                                  

                                                                                                  

NIH Stroke Scale - NIH Stroke Score                                                               

Date: 10/08/2024                                                                                  

Time: 18:45                                                                                       

Total Score = 0                                                                                   

  10. Dysarthria (speech clarity - read or repeat words) - 0(Normal)                              

  11. Extinction and Inattention (visual/tactile/auditory/spatial/personal) - 0(No                

      abnormality)                                                                                

  1a. Level of Consciousness (LOC) - 0(Alert)                                                     

  1b. Level of Consciousness (LOC) (Month \T\ Age) - 0(Both)                                      

  1c. LOC Commands (Open \T\ Closes Eyes/) - 0(Both)                                          

   2. Best Gaze (Lateral Gaze Paresis) - 0(Normal)                                                

   3. Visual Field Loss - 0(No visual loss)                                                       

   4. Facial Palsy - 0(Normal)                                                                    

  5a. Left Arm: Motor (10-second hold) - 0(No drift)                                              

  5b. Right Arm: Motor (10-second hold) - 0(No drift)                                             

  6a. Left Leg: Motor (5-second hold - always test supine) - 0(No drift)                          

  6b. Right Leg: Motor (5-second hold - always test supine) - 0(No drift)                         

   7. Limb Ataxia (finger/nose \T\ heel/shin - test with eyes open) - 0(Absent)                   

   8. Sensory Loss (pinprick arms/legs/face) - 0(Normal)                                          

   9. Best Language: Aphasia (description/naming/reading) - 0(No aphasia)                         

Initials:                                                                                       

                                                                                                  

Signatures:                                                                                       

Dispatcher [employer]                           EDMS                                                 

Adler, Esther, Reg                       Reg  mr                                                   

Kayley Colindres, DINORAH COPELAND   iw                                                   

Haleigh Clinton RN RN   ss                                                   

Alysha Ortiz RN RN   hb                                                   

Arnel Murphy,                         DO   ms3                                                  

Archer, Shannon, RN                     RN   br2                                                  

[employer] RN   lg3                                                  

                                                                                                  

Corrections: (The following items were deleted from the chart)                                    

18:08 18:03 Chief complaint: Dizziness and nausea that started 3 hours ago. hb        hb          

18:09 18:03 Acuity: ALESSANDRA 3 hb                                                          hb          

19:00 18:45 [place] Protocol Exclusion Criteria: Brief Cognitive Screen What is  iw          

      your name? Normal, Where are you right now? Normal, What year is it? Normal.                

      Oral Mechanism Examination Facial Symmetry: Normal, Motion: Normal, Lip                     

      Closure: Normal, Oral Mechanism Result: Normal. 3 oz Water Swallow Challenge:               

      Pt able to drink all water without stopping, coughing, choking or throat                    

      clearing: Yes Result: PASS MD Notified: Arnel Murphy DO iw                                   

19:07 18:46 Inserted saline lock: 22 gauge in right antecubital area, using aseptic   iw          

      technique. Blood collected. Flushed with 10 mL NS iw                                        

                                                                                                  

**************************************************************************************************

## 2024-10-09 VITALS — DIASTOLIC BLOOD PRESSURE: 76 MMHG | SYSTOLIC BLOOD PRESSURE: 142 MMHG

## 2024-10-09 VITALS — TEMPERATURE: 98 F

## 2024-10-09 LAB
ALBUMIN SERPL BCP-MCNC: 2.9 G/DL (ref 3.4–5)
ALBUMIN/GLOB SERPL: 1.1 {RATIO} (ref 1.1–1.8)
ALP SERPL-CCNC: 111 U/L (ref 45–117)
ALT SERPL W P-5'-P-CCNC: 36 U/L (ref 16–61)
ANION GAP SERPL CALC-SCNC: 9.8 MEQ/L (ref 5–15)
AST SERPL W P-5'-P-CCNC: 23 U/L (ref 15–37)
BUN BLD-MCNC: 6 MG/DL (ref 7–18)
GLOBULIN SER CALC-MCNC: 2.7 G/DL (ref 2.3–3.5)
GLUCOSE SERPLBLD-MCNC: 127 MG/DL (ref 74–106)
HCT VFR BLD CALC: 35.3 % (ref 39.6–49)
HDLC SERPL-MCNC: 90 MG/DL (ref 40–60)
HGB BLD-MCNC: 12 G/DL (ref 13.6–17.9)
LDLC SERPL CALC-MCNC: 53 MG/DL (ref ?–130)
LDLC SERPL-MCNC: 53 MG/DL
LYMPHOCYTES # SPEC AUTO: 1.1 K/UL (ref 0.7–4.9)
MCH RBC QN AUTO: 35.1 PG (ref 27–35)
MCHC RBC AUTO-ENTMCNC: 34.2 G/DL (ref 32–36)
MCV RBC: 102.9 FL (ref 80–100)
NRBC # BLD: 0 10*3/UL (ref 0–0)
NRBC BLD AUTO-RTO: 0 % (ref 0–0)
PMV BLD: 9.3 FL (ref 7.6–11.3)
POTASSIUM SERPL-SCNC: 3.8 MEQ/L (ref 3.5–5.1)
RBC # BLD: 3.43 M/UL (ref 4.33–5.43)
WBC # BLD AUTO: 3 THOU/UL (ref 4.3–10.9)

## 2024-10-09 RX ADMIN — ASPIRIN SCH MG: 81 TABLET, COATED ORAL at 08:46

## 2024-10-09 RX ADMIN — LEVOTHYROXINE SODIUM SCH MG: 75 TABLET ORAL at 08:46

## 2024-10-09 NOTE — P.PN
Date of Service: 10/09/24


subjective


Presented with nausea and dizziness, neurology consulted for stroke eval








Review of Systems 


10-point ROS is otherwise unremarkable








Physical Examination





- Physical Exam





Vital signs


Reviewed








General: Alert, In no apparent distress, Oriented x3


HEENT: Atraumatic, Normocephalic


Respiratory: Clear to auscultation bilaterally, Normal air movement


Cardiovascular: No edema, Normal pulses, Regular rate/rhythm


Gastrointestinal: Normal bowel sounds, Soft and benign


Integumentary: No rashes


Neurological: Normal speech, Normal strength at 5/5 x4 extr, Sensation intact, 

Cranial nerves 3-12 intact

















Assessment and Plan





- Problems (Diagnosis)


Dizziness


Neurology consult, fall precautions


MRI of the right knee, echo ordered


continue ASA, statin, permissive HTN


PT OT ST


NIH stroke scale





Nausea


As needed antiemetics





- Plan


69-year-old male with past medical history of diabetes, hypothyroidism presented

to the emergency room with complaints of dizziness 





NIDDM


Accu-Cheks, sliding scale insulin





hypothyroidism


Resume appropriate home meds


TSH 1.97


Follow-up with PCP after discharge








Full code


Diet cardiac


SCDs





Disposition Home pending hospital course





- Advance Directives


Does patient have a Living Will: No


Does patient have a Durable POA for Healthcare: No

## 2024-10-09 NOTE — ECHO
HEIGHT: 5 ft 10 in   WEIGHT: 153 lb 8 oz   DATE OF STUDY: 10/09/2024   REFER DR: 
Lupis Sosa MD

2-DIMENSIONAL: YES

     M.MODE: YES

 DOPPLER: YES

COLOR FLOW: YES



                    TDS:  

PORTABLE: YES

 DEFINITY:  

BUBBLE STUDY: 





DIAGNOSIS:  STROKE



CARDIAC HISTORY:  

CATHERIZATION: NO

SURGERY: NO

PROSTHETIC VALVE: NO

PACEMAKER: NO





MEASUREMENTS (cm)

    DIASTOLIC (NORMALS)                 SYSTOLIC (NORMALS)

IVSd                 1.1 (0.6-1.2)                    LA Diam 3.1 (1.9-4.0)     LVEF       
  60-65%  

LVIDd               4.3 (3.5-5.7)                        LVIDs      2.7 (2.0-3.5)     %FS  
        37%

LVPWd             1.1 (0.6-1.2)

Ao Diam           3.0 (2.0-3.7)



2 DIMENSIONAL ASSESSMENT:

RIGHT ATRIUM:                   NORMAL

LEFT ATRIUM:       NORMAL



RIGHT VENTRICLE:            NORMAL

LEFT VENTRICLE: NORMAL



TRICUSPID VALVE:  TRACE TRICUSPID REGURGITATION

MITRAL VALVE:     TRACE MITRAL REGURGITATION



PULMONIC VALVE:             NORMAL

AORTIC VALVE:     TRACE AORTIC REGURGITATION



PERICARDIAL EFFUSION: NONE

AORTIC ROOT:      NORMAL





LEFT VENTRICULAR WALL MOTION:     NORMAL



DOPPLER/COLOR FLOW:     NORMAL



COMMENTS:      

  1. NORMAL LEFT VENTRICULAR SYSTOLIC FUNCTION, EJECTION FRACTION 60-65%, 

NORMAL WALL MOTION

  2. NORMAL DIASTOLIC FUNCTION



TECHNOLOGIST:   NICHOLAS SESAY

## 2024-10-09 NOTE — P.DS
Admission Date: 10/08/24


Discharge Date: 10/09/24


Disposition: ROUTINE DISCHARGE


Discharge Condition: FAIR


Reason for Admission: dizziness





- Problems


(1) Dizziness


Current Visit: Yes   Status: Acute   





(2) Hyperthyroidism determined by thyroid function test


Current Visit: Yes   Status: Acute   





(3) History of hypothyroidism


Current Visit: Yes   Status: Acute   





(4) Pancytopenia


Current Visit: Yes   Status: Acute   


Brief History of Present Illness: 


69-year-old male with past medical history of diabetes, hypothyroidism presented

to the emergency room with complaints of vertigo of 1 day duration, associated 

with nausea but denies vomiting.  He describes the vertigo as the room spinning,

no tinnitus, no hearing impairment. He denie any fall or head trauma, he denied 

any fevers or chills or focal weaknes or difficulty with speech or swallowing.  

He denied any prior upper respiratory symptoms.  Evaluation in the ER with head 

CT did not show any acute disease. Neurology was contacted in the emergency room

who recommended admission and stroke workup.





Hospital Course: 


Patient was placed under observation on the medical floor and patient evaluated 

for acute CVA with MRI which was negative.  Echocardiogram done, CTA head and 

neck were also unremarkable.  Acute CVA ruled out.  Blood work showed markedly 

low TSH and high free T4 indicating hyperthyroid state.  Patient has dizziness 

and vertigo symptoms most likely related to hyperthyroidism.  Her Synthroid dose

is decreased from 175 mcg to 100 mcg daily on discharge.  Lipid profile checked 

showed LDL below target.  Patient noted to have pancytopenia with leukopenia and

thrombocytopenia.  Lovenox for DVT prophylaxis not given due to 

thrombocytopenia.  Patient had no problems swallowing or speech problems, he 

also had no focal weakness or difficulty ambulating so patient did not require 

rehab evaluation and services.








Vital Signs/Physical Exam: 














Temp Pulse Resp BP Pulse Ox


 


 98.0 F   76   16   142/76 H  98 


 


 10/09/24 12:00  10/09/24 12:00  10/09/24 12:00  10/09/24 12:00  10/09/24 12:00








General: Alert, In no apparent distress, Oriented x3


HEENT: Mucous membr. moist/pink, EOMI, Sclerae nonicteric


Neck: Supple, JVD not distended


Respiratory: Clear to auscultation bilaterally, Normal air movement


Cardiovascular: No edema, Regular rate/rhythm, Normal S1 S2, No murmurs


Gastrointestinal: Normal bowel sounds, Soft and benign, Non-distended, No 

tenderness


Musculoskeletal: No swelling, No tenderness


Integumentary: No rashes, No cyanosis


Neurological: Normal speech, Normal strength at 5/5 x4 extr, Cranial nerves 3-12

intact


Laboratory Data at Discharge: 














WBC  3.00 thou/uL (4.3-10.9)  L  10/09/24  05:04    


 


Hgb  12.0 g/dL (13.6-17.9)  L  10/09/24  05:04    


 


Hct  35.3 % (39.6-49.0)  L  10/09/24  05:04    


 


Plt Count  60 thou/uL (152-406)  L  10/09/24  05:04    


 


PT  13.5 SECONDS (9.4-12.5)  H  10/08/24  18:41    


 


INR  1.21   10/08/24  18:41    


 


APTT  33.1 SECONDS (24.3-36.9)   10/08/24  18:41    


 


Sodium  141 mEq/L (136-145)  D 10/09/24  05:04    


 


Potassium  3.8 mEq/L (3.5-5.1)   10/09/24  05:04    


 


BUN  6 mg/dL (7-18)  L  10/09/24  05:04    


 


Creatinine  0.46 mg/dL (0.70-1.30)  L  10/09/24  05:04    


 


Glucose  127 mg/dL ()  H  10/09/24  05:04    


 


Magnesium  1.6 mg/dL (1.6-2.4)   10/08/24  18:41    


 


Total Bilirubin  1.5 mg/dL (0.2-1.0)  H  10/09/24  05:04    


 


AST  23 U/L (15-37)   10/09/24  05:04    


 


ALT  36 U/L (16-61)   10/09/24  05:04    


 


Alkaline Phosphatase  111 U/L ()   10/09/24  05:04    


 


Triglycerides  65 mg/dL (<150)   10/09/24  05:04    


 


Cholesterol  156 mg/dL (<200)   10/09/24  05:04    


 


HDL Cholesterol  90 mg/dL (40-60)  H  10/09/24  05:04    


 


Cholesterol/HDL Ratio  1.73   10/09/24  05:04    








Home Medications: 








Hydrocodone/Acetaminophen [Hydrocodone-Acetamin  mg] 1 each PO Q8HP PRN 

10/08/24 


Metformin ER [Glucophage ER*] 1,000 mg PO BID 10/08/24 


Aspirin [Aspirin EC 81 MG] 81 mg PO DAILY #30 tab 10/09/24 


Levothyroxine [Synthroid*] 100 mcg PO [id number] #30 tab 10/09/24 





New Medications: 


Aspirin [Aspirin EC 81 MG] 81 mg PO DAILY #30 tab


Levothyroxine [Synthroid*] 100 mcg PO [id number] #30 tab


Followup: 


Sumanth Mccurdy MD [Primary Care Provider] - 1-2 Weeks


Time spent managing pt's care (in minutes): 27

## 2024-10-09 NOTE — RAD REPORT
EXAMINATION: MRI BRAIN WITHOUT AND WITH CONTRAST 



CLINICAL INDICATION: dizziness, concern for cva



TECHNIQUE: Multiplanar multisequence MR images of the brain were obtained without and with intravenou
s contrast. Unless otherwise specified, incidental findings do not require dedicated imaging

follow-up. 



COMPARISON: CT imaging 10/8/2024



FINDINGS:



INTRACRANIAL: Diffusion-weighted images show no acute or early subacute infarction. There is moderate
 brain atrophy with moderateT2/FLAIR hyperintensities in the periventricular and deep white matter

regions, likely representing chronic microvascular ischemic changes. There is no mass effect or midli
ne shift. No abnormal extraaxial fluid collection.



VASCULATURE: Normal signal voids in the larger intracranial arteries and dural venous sinuses.



SINUSES: The paranasal sinuses and mastoid air cells are predominantly clear.



BONE: The marrow signal pattern is within normal limits.



CONTRAST: No pathologic postcontrast enhancement to indicate tumor or infection.



OTHER FINDINGS:



IMPRESSION: 



Negative for acute CVA or other acute intracranial process.







 



Reported By: Audie Sequeira 

Electronically Signed:  10/9/2024 10:04 AM

## 2024-10-10 NOTE — EKG
Test Date:    2024-10-08               Test Time:    18:43:03

Technician:   JAMISON                                     

                                                     

MEASUREMENT RESULTS:                                       

Intervals:                                           

Rate:         0                                      

NM:                                                  

QRSD:         0                                      

QT:           0                                      

QTc:          0                                      

Axis:                                                

P:                                                   

NM:                                                  

QRS:          0                                      

T:            0                                      

                                                     

INTERPRETIVE STATEMENTS:                                       

                                                     

** No QRS complexes found, no ECG analysis possible **

Compared to ECG 03/27/2022 22:09:13

Sinus rhythm no longer present

Left ventricular hypertrophy no longer present

ST (T wave) deviation no longer present



Electronically Signed On 10-10-24 16:51:42 CDT by Frank Jha

## 2024-10-10 NOTE — EKG
Test Date:    2024-10-08               Test Time:    18:50:05

Technician:   JAMISON                                     

                                                     

MEASUREMENT RESULTS:                                       

Intervals:                                           

Rate:         86                                     

NY:           180                                    

QRSD:         94                                     

QT:           402                                    

QTc:          481                                    

Axis:                                                

P:            44                                     

NY:           180                                    

QRS:          -33                                    

T:            73                                     

                                                     

INTERPRETIVE STATEMENTS:                                       

                                                     

Normal sinus rhythm

Left axis deviation

Left ventricular hypertrophy with repolarization abnormality

Prolonged QT

Abnormal ECG

Compared to ECG 10/08/2024 18:43:03

Left-axis deviation now present

Left ventricular hypertrophy now present

Early repolarization now present

Prolonged QT interval now present



Electronically Signed On 10-10-24 16:51:39 CDT by Frank Jha